# Patient Record
Sex: FEMALE | Race: WHITE | URBAN - METROPOLITAN AREA
[De-identification: names, ages, dates, MRNs, and addresses within clinical notes are randomized per-mention and may not be internally consistent; named-entity substitution may affect disease eponyms.]

---

## 2017-01-01 ENCOUNTER — APPOINTMENT (OUTPATIENT)
Dept: PHYSICAL THERAPY | Facility: CLINIC | Age: 82
DRG: 682 | End: 2017-01-01
Attending: INTERNAL MEDICINE
Payer: MEDICARE

## 2017-01-01 ENCOUNTER — APPOINTMENT (OUTPATIENT)
Dept: PHYSICAL THERAPY | Facility: CLINIC | Age: 82
DRG: 682 | End: 2017-01-01
Payer: MEDICARE

## 2017-01-01 ENCOUNTER — ANESTHESIA EVENT (OUTPATIENT)
Dept: ONCOLOGY | Facility: CLINIC | Age: 82
End: 2017-01-01

## 2017-01-01 ENCOUNTER — HOSPITAL ENCOUNTER (EMERGENCY)
Facility: CLINIC | Age: 82
Discharge: HOSPICE | End: 2017-08-03
Attending: EMERGENCY MEDICINE | Admitting: EMERGENCY MEDICINE
Payer: MEDICARE

## 2017-01-01 ENCOUNTER — APPOINTMENT (OUTPATIENT)
Dept: GENERAL RADIOLOGY | Facility: CLINIC | Age: 82
DRG: 682 | End: 2017-01-01
Attending: HOSPITALIST
Payer: MEDICARE

## 2017-01-01 ENCOUNTER — APPOINTMENT (OUTPATIENT)
Dept: ULTRASOUND IMAGING | Facility: CLINIC | Age: 82
DRG: 682 | End: 2017-01-01
Attending: EMERGENCY MEDICINE
Payer: MEDICARE

## 2017-01-01 ENCOUNTER — APPOINTMENT (OUTPATIENT)
Dept: GENERAL RADIOLOGY | Facility: CLINIC | Age: 82
End: 2017-01-01
Attending: EMERGENCY MEDICINE
Payer: MEDICARE

## 2017-01-01 ENCOUNTER — HOSPITAL ENCOUNTER (INPATIENT)
Facility: CLINIC | Age: 82
LOS: 9 days | Discharge: HOME HEALTH | DRG: 682 | End: 2017-03-06
Attending: EMERGENCY MEDICINE | Admitting: INTERNAL MEDICINE
Payer: MEDICARE

## 2017-01-01 ENCOUNTER — ANESTHESIA (OUTPATIENT)
Dept: ONCOLOGY | Facility: CLINIC | Age: 82
End: 2017-01-01

## 2017-01-01 VITALS
BODY MASS INDEX: 28.53 KG/M2 | DIASTOLIC BLOOD PRESSURE: 86 MMHG | SYSTOLIC BLOOD PRESSURE: 188 MMHG | WEIGHT: 156 LBS | OXYGEN SATURATION: 92 % | HEART RATE: 64 BPM | RESPIRATION RATE: 10 BRPM | TEMPERATURE: 97.5 F

## 2017-01-01 VITALS
TEMPERATURE: 97.9 F | OXYGEN SATURATION: 95 % | SYSTOLIC BLOOD PRESSURE: 182 MMHG | HEART RATE: 94 BPM | BODY MASS INDEX: 22.68 KG/M2 | DIASTOLIC BLOOD PRESSURE: 68 MMHG | WEIGHT: 123.24 LBS | HEIGHT: 62 IN | RESPIRATION RATE: 16 BRPM

## 2017-01-01 DIAGNOSIS — N18.30 BENIGN HYPERTENSION WITH CHRONIC KIDNEY DISEASE, STAGE III (H): Primary | ICD-10-CM

## 2017-01-01 DIAGNOSIS — Z51.5 COMFORT MEASURES ONLY STATUS: ICD-10-CM

## 2017-01-01 DIAGNOSIS — F02.818 LATE ONSET ALZHEIMER'S DISEASE WITH BEHAVIORAL DISTURBANCE (H): ICD-10-CM

## 2017-01-01 DIAGNOSIS — G30.1 LATE ONSET ALZHEIMER'S DISEASE WITH BEHAVIORAL DISTURBANCE (H): ICD-10-CM

## 2017-01-01 DIAGNOSIS — J81.0 ACUTE PULMONARY EDEMA (H): ICD-10-CM

## 2017-01-01 DIAGNOSIS — R53.81 PHYSICAL DECONDITIONING: ICD-10-CM

## 2017-01-01 DIAGNOSIS — I12.9 BENIGN HYPERTENSION WITH CHRONIC KIDNEY DISEASE, STAGE III (H): Primary | ICD-10-CM

## 2017-01-01 DIAGNOSIS — E86.9 VOLUME DEPLETION: ICD-10-CM

## 2017-01-01 DIAGNOSIS — I10 BENIGN ESSENTIAL HYPERTENSION: ICD-10-CM

## 2017-01-01 DIAGNOSIS — R41.82 ALTERED MENTAL STATUS, UNSPECIFIED ALTERED MENTAL STATUS TYPE: ICD-10-CM

## 2017-01-01 DIAGNOSIS — N17.9 ACUTE RENAL FAILURE, UNSPECIFIED ACUTE RENAL FAILURE TYPE (H): ICD-10-CM

## 2017-01-01 LAB
ALBUMIN SERPL-MCNC: 2.9 G/DL (ref 3.4–5)
ALBUMIN UR-MCNC: 300 MG/DL
ALP SERPL-CCNC: 151 U/L (ref 40–150)
ALT SERPL W P-5'-P-CCNC: 27 U/L (ref 0–50)
ANION GAP SERPL CALCULATED.3IONS-SCNC: 10 MMOL/L (ref 3–14)
ANION GAP SERPL CALCULATED.3IONS-SCNC: 11 MMOL/L (ref 3–14)
ANION GAP SERPL CALCULATED.3IONS-SCNC: 8 MMOL/L (ref 3–14)
ANION GAP SERPL CALCULATED.3IONS-SCNC: 9 MMOL/L (ref 3–14)
APPEARANCE UR: CLEAR
AST SERPL W P-5'-P-CCNC: 30 U/L (ref 0–45)
BASOPHILS # BLD AUTO: 0 10E9/L (ref 0–0.2)
BASOPHILS NFR BLD AUTO: 0.2 %
BASOPHILS NFR BLD AUTO: 0.3 %
BASOPHILS NFR BLD AUTO: 0.4 %
BASOPHILS NFR BLD AUTO: 0.4 %
BASOPHILS NFR BLD AUTO: 0.5 %
BASOPHILS NFR BLD AUTO: 0.6 %
BASOPHILS NFR BLD AUTO: 0.7 %
BASOPHILS NFR BLD AUTO: 0.7 %
BILIRUB SERPL-MCNC: 0.7 MG/DL (ref 0.2–1.3)
BILIRUB UR QL STRIP: NEGATIVE
BUN SERPL-MCNC: 25 MG/DL (ref 7–30)
BUN SERPL-MCNC: 27 MG/DL (ref 7–30)
BUN SERPL-MCNC: 27 MG/DL (ref 7–30)
BUN SERPL-MCNC: 28 MG/DL (ref 7–30)
BUN SERPL-MCNC: 30 MG/DL (ref 7–30)
BUN SERPL-MCNC: 31 MG/DL (ref 7–30)
BUN SERPL-MCNC: 35 MG/DL (ref 7–30)
BUN SERPL-MCNC: 36 MG/DL (ref 7–30)
BUN SERPL-MCNC: 37 MG/DL (ref 7–30)
CALCIUM SERPL-MCNC: 7.9 MG/DL (ref 8.5–10.1)
CALCIUM SERPL-MCNC: 8 MG/DL (ref 8.5–10.1)
CALCIUM SERPL-MCNC: 8.1 MG/DL (ref 8.5–10.1)
CALCIUM SERPL-MCNC: 8.2 MG/DL (ref 8.5–10.1)
CALCIUM SERPL-MCNC: 8.2 MG/DL (ref 8.5–10.1)
CALCIUM SERPL-MCNC: 8.3 MG/DL (ref 8.5–10.1)
CALCIUM SERPL-MCNC: 8.5 MG/DL (ref 8.5–10.1)
CALCIUM SERPL-MCNC: 8.6 MG/DL (ref 8.5–10.1)
CHLORIDE SERPL-SCNC: 111 MMOL/L (ref 94–109)
CHLORIDE SERPL-SCNC: 116 MMOL/L (ref 94–109)
CHLORIDE SERPL-SCNC: 118 MMOL/L (ref 94–109)
CHLORIDE SERPL-SCNC: 119 MMOL/L (ref 94–109)
CHLORIDE SERPL-SCNC: 120 MMOL/L (ref 94–109)
CHLORIDE SERPL-SCNC: 121 MMOL/L (ref 94–109)
CHLORIDE SERPL-SCNC: 123 MMOL/L (ref 94–109)
CHLORIDE SERPL-SCNC: 125 MMOL/L (ref 94–109)
CO2 SERPL-SCNC: 18 MMOL/L (ref 20–32)
CO2 SERPL-SCNC: 19 MMOL/L (ref 20–32)
CO2 SERPL-SCNC: 20 MMOL/L (ref 20–32)
CO2 SERPL-SCNC: 20 MMOL/L (ref 20–32)
CO2 SERPL-SCNC: 21 MMOL/L (ref 20–32)
CO2 SERPL-SCNC: 23 MMOL/L (ref 20–32)
COLOR UR AUTO: YELLOW
CREAT SERPL-MCNC: 1.37 MG/DL (ref 0.52–1.04)
CREAT SERPL-MCNC: 1.43 MG/DL (ref 0.52–1.04)
CREAT SERPL-MCNC: 1.44 MG/DL (ref 0.52–1.04)
CREAT SERPL-MCNC: 1.54 MG/DL (ref 0.52–1.04)
CREAT SERPL-MCNC: 1.65 MG/DL (ref 0.52–1.04)
CREAT SERPL-MCNC: 1.69 MG/DL (ref 0.52–1.04)
CREAT SERPL-MCNC: 1.69 MG/DL (ref 0.52–1.04)
CREAT SERPL-MCNC: 1.71 MG/DL (ref 0.52–1.04)
CREAT SERPL-MCNC: 2.17 MG/DL (ref 0.52–1.04)
CREAT SERPL-MCNC: 2.17 MG/DL (ref 0.52–1.04)
CREAT SERPL-MCNC: 2.26 MG/DL (ref 0.52–1.04)
DIFFERENTIAL METHOD BLD: ABNORMAL
EOSINOPHIL # BLD AUTO: 0 10E9/L (ref 0–0.7)
EOSINOPHIL # BLD AUTO: 0.1 10E9/L (ref 0–0.7)
EOSINOPHIL # BLD AUTO: 0.2 10E9/L (ref 0–0.7)
EOSINOPHIL # BLD AUTO: 0.3 10E9/L (ref 0–0.7)
EOSINOPHIL # BLD AUTO: 0.3 10E9/L (ref 0–0.7)
EOSINOPHIL NFR BLD AUTO: 0.4 %
EOSINOPHIL NFR BLD AUTO: 1.8 %
EOSINOPHIL NFR BLD AUTO: 2.3 %
EOSINOPHIL NFR BLD AUTO: 2.9 %
EOSINOPHIL NFR BLD AUTO: 3 %
EOSINOPHIL NFR BLD AUTO: 3 %
EOSINOPHIL NFR BLD AUTO: 3.5 %
EOSINOPHIL NFR BLD AUTO: 4 %
EOSINOPHIL NFR BLD AUTO: 4.2 %
EOSINOPHIL NFR BLD AUTO: 5.3 %
ERYTHROCYTE [DISTWIDTH] IN BLOOD BY AUTOMATED COUNT: 15.3 % (ref 10–15)
ERYTHROCYTE [DISTWIDTH] IN BLOOD BY AUTOMATED COUNT: 15.8 % (ref 10–15)
ERYTHROCYTE [DISTWIDTH] IN BLOOD BY AUTOMATED COUNT: 15.8 % (ref 10–15)
ERYTHROCYTE [DISTWIDTH] IN BLOOD BY AUTOMATED COUNT: 15.9 % (ref 10–15)
ERYTHROCYTE [DISTWIDTH] IN BLOOD BY AUTOMATED COUNT: 15.9 % (ref 10–15)
ERYTHROCYTE [DISTWIDTH] IN BLOOD BY AUTOMATED COUNT: 16 % (ref 10–15)
ERYTHROCYTE [DISTWIDTH] IN BLOOD BY AUTOMATED COUNT: 16.1 % (ref 10–15)
ERYTHROCYTE [DISTWIDTH] IN BLOOD BY AUTOMATED COUNT: 16.2 % (ref 10–15)
ERYTHROCYTE [DISTWIDTH] IN BLOOD BY AUTOMATED COUNT: 16.3 % (ref 10–15)
ERYTHROCYTE [DISTWIDTH] IN BLOOD BY AUTOMATED COUNT: 16.3 % (ref 10–15)
ERYTHROCYTE [DISTWIDTH] IN BLOOD BY AUTOMATED COUNT: 16.4 % (ref 10–15)
FOLATE SERPL-MCNC: 10.6 NG/ML
GFR SERPL CREATININE-BSD FRML MDRD: 20 ML/MIN/1.7M2
GFR SERPL CREATININE-BSD FRML MDRD: 21 ML/MIN/1.7M2
GFR SERPL CREATININE-BSD FRML MDRD: 21 ML/MIN/1.7M2
GFR SERPL CREATININE-BSD FRML MDRD: 28 ML/MIN/1.7M2
GFR SERPL CREATININE-BSD FRML MDRD: 29 ML/MIN/1.7M2
GFR SERPL CREATININE-BSD FRML MDRD: 31 ML/MIN/1.7M2
GFR SERPL CREATININE-BSD FRML MDRD: 34 ML/MIN/1.7M2
GFR SERPL CREATININE-BSD FRML MDRD: 34 ML/MIN/1.7M2
GFR SERPL CREATININE-BSD FRML MDRD: 36 ML/MIN/1.7M2
GLUCOSE SERPL-MCNC: 102 MG/DL (ref 70–99)
GLUCOSE SERPL-MCNC: 104 MG/DL (ref 70–99)
GLUCOSE SERPL-MCNC: 112 MG/DL (ref 70–99)
GLUCOSE SERPL-MCNC: 113 MG/DL (ref 70–99)
GLUCOSE SERPL-MCNC: 113 MG/DL (ref 70–99)
GLUCOSE SERPL-MCNC: 114 MG/DL (ref 70–99)
GLUCOSE SERPL-MCNC: 116 MG/DL (ref 70–99)
GLUCOSE SERPL-MCNC: 132 MG/DL (ref 70–99)
GLUCOSE SERPL-MCNC: 133 MG/DL (ref 70–99)
GLUCOSE SERPL-MCNC: 88 MG/DL (ref 70–99)
GLUCOSE SERPL-MCNC: 97 MG/DL (ref 70–99)
GLUCOSE UR STRIP-MCNC: 30 MG/DL
HCT VFR BLD AUTO: 26.2 % (ref 35–47)
HCT VFR BLD AUTO: 26.4 % (ref 35–47)
HCT VFR BLD AUTO: 27 % (ref 35–47)
HCT VFR BLD AUTO: 27.7 % (ref 35–47)
HCT VFR BLD AUTO: 28.3 % (ref 35–47)
HCT VFR BLD AUTO: 28.8 % (ref 35–47)
HCT VFR BLD AUTO: 29 % (ref 35–47)
HCT VFR BLD AUTO: 29.5 % (ref 35–47)
HCT VFR BLD AUTO: 29.7 % (ref 35–47)
HCT VFR BLD AUTO: 29.8 % (ref 35–47)
HCT VFR BLD AUTO: 30.4 % (ref 35–47)
HGB BLD-MCNC: 8.4 G/DL (ref 11.7–15.7)
HGB BLD-MCNC: 8.4 G/DL (ref 11.7–15.7)
HGB BLD-MCNC: 8.7 G/DL (ref 11.7–15.7)
HGB BLD-MCNC: 8.8 G/DL (ref 11.7–15.7)
HGB BLD-MCNC: 9 G/DL (ref 11.7–15.7)
HGB BLD-MCNC: 9 G/DL (ref 11.7–15.7)
HGB BLD-MCNC: 9.1 G/DL (ref 11.7–15.7)
HGB BLD-MCNC: 9.2 G/DL (ref 11.7–15.7)
HGB BLD-MCNC: 9.5 G/DL (ref 11.7–15.7)
HGB BLD-MCNC: 9.6 G/DL (ref 11.7–15.7)
HGB BLD-MCNC: 9.6 G/DL (ref 11.7–15.7)
HGB UR QL STRIP: ABNORMAL
HYALINE CASTS #/AREA URNS LPF: 1 /LPF (ref 0–2)
IMM GRANULOCYTES # BLD: 0 10E9/L (ref 0–0.4)
IMM GRANULOCYTES NFR BLD: 0.1 %
IMM GRANULOCYTES NFR BLD: 0.2 %
IMM GRANULOCYTES NFR BLD: 0.2 %
IMM GRANULOCYTES NFR BLD: 0.3 %
IMM GRANULOCYTES NFR BLD: 0.3 %
IMM GRANULOCYTES NFR BLD: 0.4 %
IMM GRANULOCYTES NFR BLD: 0.5 %
IMM GRANULOCYTES NFR BLD: 0.6 %
INTERPRETATION ECG - MUSE: NORMAL
INTERPRETATION ECG - MUSE: NORMAL
KETONES UR STRIP-MCNC: NEGATIVE MG/DL
LEUKOCYTE ESTERASE UR QL STRIP: NEGATIVE
LYMPHOCYTES # BLD AUTO: 1 10E9/L (ref 0.8–5.3)
LYMPHOCYTES # BLD AUTO: 1 10E9/L (ref 0.8–5.3)
LYMPHOCYTES # BLD AUTO: 1.1 10E9/L (ref 0.8–5.3)
LYMPHOCYTES # BLD AUTO: 1.2 10E9/L (ref 0.8–5.3)
LYMPHOCYTES # BLD AUTO: 1.2 10E9/L (ref 0.8–5.3)
LYMPHOCYTES # BLD AUTO: 1.3 10E9/L (ref 0.8–5.3)
LYMPHOCYTES # BLD AUTO: 1.3 10E9/L (ref 0.8–5.3)
LYMPHOCYTES # BLD AUTO: 1.4 10E9/L (ref 0.8–5.3)
LYMPHOCYTES # BLD AUTO: 1.6 10E9/L (ref 0.8–5.3)
LYMPHOCYTES # BLD AUTO: 1.7 10E9/L (ref 0.8–5.3)
LYMPHOCYTES NFR BLD AUTO: 14.9 %
LYMPHOCYTES NFR BLD AUTO: 15.7 %
LYMPHOCYTES NFR BLD AUTO: 16.1 %
LYMPHOCYTES NFR BLD AUTO: 16.3 %
LYMPHOCYTES NFR BLD AUTO: 19.9 %
LYMPHOCYTES NFR BLD AUTO: 20.3 %
LYMPHOCYTES NFR BLD AUTO: 21.2 %
LYMPHOCYTES NFR BLD AUTO: 21.4 %
LYMPHOCYTES NFR BLD AUTO: 21.5 %
LYMPHOCYTES NFR BLD AUTO: 23.8 %
MCH RBC QN AUTO: 26.5 PG (ref 26.5–33)
MCH RBC QN AUTO: 26.5 PG (ref 26.5–33)
MCH RBC QN AUTO: 26.7 PG (ref 26.5–33)
MCH RBC QN AUTO: 26.8 PG (ref 26.5–33)
MCH RBC QN AUTO: 26.8 PG (ref 26.5–33)
MCH RBC QN AUTO: 26.9 PG (ref 26.5–33)
MCH RBC QN AUTO: 27 PG (ref 26.5–33)
MCHC RBC AUTO-ENTMCNC: 31 G/DL (ref 31.5–36.5)
MCHC RBC AUTO-ENTMCNC: 31.1 G/DL (ref 31.5–36.5)
MCHC RBC AUTO-ENTMCNC: 31.2 G/DL (ref 31.5–36.5)
MCHC RBC AUTO-ENTMCNC: 31.6 G/DL (ref 31.5–36.5)
MCHC RBC AUTO-ENTMCNC: 31.6 G/DL (ref 31.5–36.5)
MCHC RBC AUTO-ENTMCNC: 31.8 G/DL (ref 31.5–36.5)
MCHC RBC AUTO-ENTMCNC: 31.9 G/DL (ref 31.5–36.5)
MCHC RBC AUTO-ENTMCNC: 32.1 G/DL (ref 31.5–36.5)
MCHC RBC AUTO-ENTMCNC: 32.2 G/DL (ref 31.5–36.5)
MCHC RBC AUTO-ENTMCNC: 32.3 G/DL (ref 31.5–36.5)
MCHC RBC AUTO-ENTMCNC: 32.5 G/DL (ref 31.5–36.5)
MCV RBC AUTO: 83 FL (ref 78–100)
MCV RBC AUTO: 84 FL (ref 78–100)
MCV RBC AUTO: 85 FL (ref 78–100)
MCV RBC AUTO: 86 FL (ref 78–100)
MCV RBC AUTO: 86 FL (ref 78–100)
MONOCYTES # BLD AUTO: 0.3 10E9/L (ref 0–1.3)
MONOCYTES # BLD AUTO: 0.3 10E9/L (ref 0–1.3)
MONOCYTES # BLD AUTO: 0.4 10E9/L (ref 0–1.3)
MONOCYTES # BLD AUTO: 0.5 10E9/L (ref 0–1.3)
MONOCYTES # BLD AUTO: 0.5 10E9/L (ref 0–1.3)
MONOCYTES NFR BLD AUTO: 3.7 %
MONOCYTES NFR BLD AUTO: 4.9 %
MONOCYTES NFR BLD AUTO: 4.9 %
MONOCYTES NFR BLD AUTO: 5.7 %
MONOCYTES NFR BLD AUTO: 6 %
MONOCYTES NFR BLD AUTO: 6.2 %
MONOCYTES NFR BLD AUTO: 6.2 %
MONOCYTES NFR BLD AUTO: 6.4 %
MONOCYTES NFR BLD AUTO: 6.6 %
MONOCYTES NFR BLD AUTO: 8.2 %
NEUTROPHILS # BLD AUTO: 3.5 10E9/L (ref 1.6–8.3)
NEUTROPHILS # BLD AUTO: 3.8 10E9/L (ref 1.6–8.3)
NEUTROPHILS # BLD AUTO: 3.9 10E9/L (ref 1.6–8.3)
NEUTROPHILS # BLD AUTO: 4.2 10E9/L (ref 1.6–8.3)
NEUTROPHILS # BLD AUTO: 4.4 10E9/L (ref 1.6–8.3)
NEUTROPHILS # BLD AUTO: 4.7 10E9/L (ref 1.6–8.3)
NEUTROPHILS # BLD AUTO: 5.2 10E9/L (ref 1.6–8.3)
NEUTROPHILS # BLD AUTO: 5.6 10E9/L (ref 1.6–8.3)
NEUTROPHILS # BLD AUTO: 6 10E9/L (ref 1.6–8.3)
NEUTROPHILS # BLD AUTO: 7.3 10E9/L (ref 1.6–8.3)
NEUTROPHILS NFR BLD AUTO: 63.2 %
NEUTROPHILS NFR BLD AUTO: 66.3 %
NEUTROPHILS NFR BLD AUTO: 67.2 %
NEUTROPHILS NFR BLD AUTO: 68.6 %
NEUTROPHILS NFR BLD AUTO: 70.7 %
NEUTROPHILS NFR BLD AUTO: 71.1 %
NEUTROPHILS NFR BLD AUTO: 74.1 %
NEUTROPHILS NFR BLD AUTO: 74.9 %
NEUTROPHILS NFR BLD AUTO: 76 %
NEUTROPHILS NFR BLD AUTO: 79.4 %
NITRATE UR QL: NEGATIVE
NRBC # BLD AUTO: 0 10*3/UL
NRBC BLD AUTO-RTO: 0 /100
PH UR STRIP: 6.5 PH (ref 5–7)
PLATELET # BLD AUTO: 135 10E9/L (ref 150–450)
PLATELET # BLD AUTO: 139 10E9/L (ref 150–450)
PLATELET # BLD AUTO: 141 10E9/L (ref 150–450)
PLATELET # BLD AUTO: 151 10E9/L (ref 150–450)
PLATELET # BLD AUTO: 152 10E9/L (ref 150–450)
PLATELET # BLD AUTO: 154 10E9/L (ref 150–450)
PLATELET # BLD AUTO: 167 10E9/L (ref 150–450)
PLATELET # BLD AUTO: 167 10E9/L (ref 150–450)
PLATELET # BLD AUTO: 175 10E9/L (ref 150–450)
PLATELET # BLD AUTO: 176 10E9/L (ref 150–450)
PLATELET # BLD AUTO: 176 10E9/L (ref 150–450)
POTASSIUM SERPL-SCNC: 3.5 MMOL/L (ref 3.4–5.3)
POTASSIUM SERPL-SCNC: 3.6 MMOL/L (ref 3.4–5.3)
POTASSIUM SERPL-SCNC: 3.6 MMOL/L (ref 3.4–5.3)
POTASSIUM SERPL-SCNC: 3.7 MMOL/L (ref 3.4–5.3)
POTASSIUM SERPL-SCNC: 3.8 MMOL/L (ref 3.4–5.3)
POTASSIUM SERPL-SCNC: 3.8 MMOL/L (ref 3.4–5.3)
POTASSIUM SERPL-SCNC: 3.9 MMOL/L (ref 3.4–5.3)
POTASSIUM SERPL-SCNC: 3.9 MMOL/L (ref 3.4–5.3)
POTASSIUM SERPL-SCNC: 4 MMOL/L (ref 3.4–5.3)
POTASSIUM SERPL-SCNC: 4.1 MMOL/L (ref 3.4–5.3)
POTASSIUM SERPL-SCNC: 4.8 MMOL/L (ref 3.4–5.3)
PROT SERPL-MCNC: 6.2 G/DL (ref 6.8–8.8)
RBC # BLD AUTO: 3.12 10E12/L (ref 3.8–5.2)
RBC # BLD AUTO: 3.13 10E12/L (ref 3.8–5.2)
RBC # BLD AUTO: 3.22 10E12/L (ref 3.8–5.2)
RBC # BLD AUTO: 3.32 10E12/L (ref 3.8–5.2)
RBC # BLD AUTO: 3.33 10E12/L (ref 3.8–5.2)
RBC # BLD AUTO: 3.39 10E12/L (ref 3.8–5.2)
RBC # BLD AUTO: 3.39 10E12/L (ref 3.8–5.2)
RBC # BLD AUTO: 3.44 10E12/L (ref 3.8–5.2)
RBC # BLD AUTO: 3.53 10E12/L (ref 3.8–5.2)
RBC # BLD AUTO: 3.55 10E12/L (ref 3.8–5.2)
RBC # BLD AUTO: 3.57 10E12/L (ref 3.8–5.2)
RBC #/AREA URNS AUTO: 2 /HPF (ref 0–2)
SODIUM SERPL-SCNC: 142 MMOL/L (ref 133–144)
SODIUM SERPL-SCNC: 143 MMOL/L (ref 133–144)
SODIUM SERPL-SCNC: 145 MMOL/L (ref 133–144)
SODIUM SERPL-SCNC: 146 MMOL/L (ref 133–144)
SODIUM SERPL-SCNC: 147 MMOL/L (ref 133–144)
SODIUM SERPL-SCNC: 148 MMOL/L (ref 133–144)
SODIUM SERPL-SCNC: 148 MMOL/L (ref 133–144)
SODIUM SERPL-SCNC: 149 MMOL/L (ref 133–144)
SODIUM SERPL-SCNC: 151 MMOL/L (ref 133–144)
SODIUM SERPL-SCNC: 152 MMOL/L (ref 133–144)
SODIUM UR-SCNC: 77 MMOL/L
SP GR UR STRIP: 1.01 (ref 1–1.03)
TSH SERPL DL<=0.005 MIU/L-ACNC: 3.06 MU/L (ref 0.4–4)
URN SPEC COLLECT METH UR: ABNORMAL
UROBILINOGEN UR STRIP-MCNC: NORMAL MG/DL (ref 0–2)
VIT B12 SERPL-MCNC: 749 PG/ML (ref 193–986)
WBC # BLD AUTO: 5.4 10E9/L (ref 4–11)
WBC # BLD AUTO: 5.5 10E9/L (ref 4–11)
WBC # BLD AUTO: 5.7 10E9/L (ref 4–11)
WBC # BLD AUTO: 6 10E9/L (ref 4–11)
WBC # BLD AUTO: 6.3 10E9/L (ref 4–11)
WBC # BLD AUTO: 6.4 10E9/L (ref 4–11)
WBC # BLD AUTO: 6.4 10E9/L (ref 4–11)
WBC # BLD AUTO: 6.9 10E9/L (ref 4–11)
WBC # BLD AUTO: 7.6 10E9/L (ref 4–11)
WBC # BLD AUTO: 8 10E9/L (ref 4–11)
WBC # BLD AUTO: 9.6 10E9/L (ref 4–11)
WBC #/AREA URNS AUTO: 3 /HPF (ref 0–2)

## 2017-01-01 PROCEDURE — 99233 SBSQ HOSP IP/OBS HIGH 50: CPT | Performed by: INTERNAL MEDICINE

## 2017-01-01 PROCEDURE — 25000132 ZZH RX MED GY IP 250 OP 250 PS 637: Mod: GY | Performed by: INTERNAL MEDICINE

## 2017-01-01 PROCEDURE — A9270 NON-COVERED ITEM OR SERVICE: HCPCS | Mod: GY | Performed by: INTERNAL MEDICINE

## 2017-01-01 PROCEDURE — 80048 BASIC METABOLIC PNL TOTAL CA: CPT | Performed by: INTERNAL MEDICINE

## 2017-01-01 PROCEDURE — 12000000 ZZH R&B MED SURG/OB

## 2017-01-01 PROCEDURE — 40000193 ZZH STATISTIC PT WARD VISIT: Performed by: PHYSICAL THERAPIST

## 2017-01-01 PROCEDURE — 36415 COLL VENOUS BLD VENIPUNCTURE: CPT | Performed by: INTERNAL MEDICINE

## 2017-01-01 PROCEDURE — 94640 AIRWAY INHALATION TREATMENT: CPT

## 2017-01-01 PROCEDURE — 81001 URINALYSIS AUTO W/SCOPE: CPT | Performed by: EMERGENCY MEDICINE

## 2017-01-01 PROCEDURE — 25000132 ZZH RX MED GY IP 250 OP 250 PS 637: Mod: GY | Performed by: HOSPITALIST

## 2017-01-01 PROCEDURE — 25000128 H RX IP 250 OP 636: Performed by: INTERNAL MEDICINE

## 2017-01-01 PROCEDURE — 25000125 ZZHC RX 250: Performed by: INTERNAL MEDICINE

## 2017-01-01 PROCEDURE — 99207 ZZC CDG-MDM COMPONENT: MEETS HIGH - UP CODED: CPT | Performed by: INTERNAL MEDICINE

## 2017-01-01 PROCEDURE — 99239 HOSP IP/OBS DSCHRG MGMT >30: CPT | Performed by: INTERNAL MEDICINE

## 2017-01-01 PROCEDURE — 85025 COMPLETE CBC W/AUTO DIFF WBC: CPT | Performed by: INTERNAL MEDICINE

## 2017-01-01 PROCEDURE — 85027 COMPLETE CBC AUTOMATED: CPT | Performed by: INTERNAL MEDICINE

## 2017-01-01 PROCEDURE — 93005 ELECTROCARDIOGRAM TRACING: CPT

## 2017-01-01 PROCEDURE — 97162 PT EVAL MOD COMPLEX 30 MIN: CPT | Mod: GP | Performed by: PHYSICAL THERAPIST

## 2017-01-01 PROCEDURE — 99223 1ST HOSP IP/OBS HIGH 75: CPT | Mod: AI | Performed by: INTERNAL MEDICINE

## 2017-01-01 PROCEDURE — 84300 ASSAY OF URINE SODIUM: CPT | Performed by: INTERNAL MEDICINE

## 2017-01-01 PROCEDURE — 99285 EMERGENCY DEPT VISIT HI MDM: CPT | Mod: 25

## 2017-01-01 PROCEDURE — 76770 US EXAM ABDO BACK WALL COMP: CPT

## 2017-01-01 PROCEDURE — 40000257 ZZH STATISTIC CONSULT NO CHARGE VASC ACCESS

## 2017-01-01 PROCEDURE — 82746 ASSAY OF FOLIC ACID SERUM: CPT | Performed by: INTERNAL MEDICINE

## 2017-01-01 PROCEDURE — 97530 THERAPEUTIC ACTIVITIES: CPT | Mod: GP

## 2017-01-01 PROCEDURE — 85025 COMPLETE CBC W/AUTO DIFF WBC: CPT | Performed by: EMERGENCY MEDICINE

## 2017-01-01 PROCEDURE — 99233 SBSQ HOSP IP/OBS HIGH 50: CPT | Performed by: HOSPITALIST

## 2017-01-01 PROCEDURE — 82607 VITAMIN B-12: CPT | Performed by: INTERNAL MEDICINE

## 2017-01-01 PROCEDURE — 97116 GAIT TRAINING THERAPY: CPT | Mod: GP | Performed by: PHYSICAL THERAPIST

## 2017-01-01 PROCEDURE — 71010 XR CHEST PORT 1 VW: CPT

## 2017-01-01 PROCEDURE — 94660 CPAP INITIATION&MGMT: CPT

## 2017-01-01 PROCEDURE — 72170 X-RAY EXAM OF PELVIS: CPT

## 2017-01-01 PROCEDURE — 97530 THERAPEUTIC ACTIVITIES: CPT | Mod: GP | Performed by: PHYSICAL THERAPIST

## 2017-01-01 PROCEDURE — 36416 COLLJ CAPILLARY BLOOD SPEC: CPT | Performed by: INTERNAL MEDICINE

## 2017-01-01 PROCEDURE — 99207 ZZC MOONLIGHTING INDICATOR: CPT | Performed by: INTERNAL MEDICINE

## 2017-01-01 PROCEDURE — 40000275 ZZH STATISTIC RCP TIME EA 10 MIN

## 2017-01-01 PROCEDURE — 84443 ASSAY THYROID STIM HORMONE: CPT | Performed by: INTERNAL MEDICINE

## 2017-01-01 PROCEDURE — 93010 ELECTROCARDIOGRAM REPORT: CPT | Performed by: INTERNAL MEDICINE

## 2017-01-01 PROCEDURE — 80048 BASIC METABOLIC PNL TOTAL CA: CPT | Performed by: EMERGENCY MEDICINE

## 2017-01-01 PROCEDURE — 25000125 ZZHC RX 250: Performed by: EMERGENCY MEDICINE

## 2017-01-01 PROCEDURE — 36415 COLL VENOUS BLD VENIPUNCTURE: CPT | Performed by: EMERGENCY MEDICINE

## 2017-01-01 PROCEDURE — 40000193 ZZH STATISTIC PT WARD VISIT

## 2017-01-01 PROCEDURE — 84295 ASSAY OF SERUM SODIUM: CPT | Performed by: INTERNAL MEDICINE

## 2017-01-01 PROCEDURE — A9270 NON-COVERED ITEM OR SERVICE: HCPCS | Mod: GY | Performed by: HOSPITALIST

## 2017-01-01 PROCEDURE — 25000128 H RX IP 250 OP 636: Performed by: EMERGENCY MEDICINE

## 2017-01-01 PROCEDURE — 80053 COMPREHEN METABOLIC PANEL: CPT | Performed by: INTERNAL MEDICINE

## 2017-01-01 RX ORDER — SODIUM CHLORIDE 9 MG/ML
INJECTION, SOLUTION INTRAVENOUS CONTINUOUS
Status: DISCONTINUED | OUTPATIENT
Start: 2017-01-01 | End: 2017-01-01

## 2017-01-01 RX ORDER — AMLODIPINE BESYLATE 10 MG/1
10 TABLET ORAL DAILY
Qty: 30 TABLET | Refills: 0 | Status: SHIPPED | OUTPATIENT
Start: 2017-01-01

## 2017-01-01 RX ORDER — POTASSIUM CHLORIDE 1.5 G/1.58G
20-40 POWDER, FOR SOLUTION ORAL
Status: DISCONTINUED | OUTPATIENT
Start: 2017-01-01 | End: 2017-01-01

## 2017-01-01 RX ORDER — LORAZEPAM 0.5 MG/1
0.25 TABLET ORAL EVERY 4 HOURS PRN
Qty: 20 TABLET | Refills: 0 | Status: SHIPPED | OUTPATIENT
Start: 2017-01-01

## 2017-01-01 RX ORDER — SODIUM CHLORIDE 9 MG/ML
1000 INJECTION, SOLUTION INTRAVENOUS CONTINUOUS
Status: DISCONTINUED | OUTPATIENT
Start: 2017-01-01 | End: 2017-01-01

## 2017-01-01 RX ORDER — ALBUTEROL SULFATE 0.83 MG/ML
1 SOLUTION RESPIRATORY (INHALATION) EVERY 4 HOURS PRN
Qty: 1 BOX | Refills: 0 | Status: SHIPPED | OUTPATIENT
Start: 2017-01-01

## 2017-01-01 RX ORDER — POLYETHYLENE GLYCOL 3350 17 G/17G
17 POWDER, FOR SOLUTION ORAL DAILY
Status: DISCONTINUED | OUTPATIENT
Start: 2017-01-01 | End: 2017-01-01 | Stop reason: HOSPADM

## 2017-01-01 RX ORDER — METOPROLOL TARTRATE 50 MG
50 TABLET ORAL 2 TIMES DAILY
Status: DISCONTINUED | OUTPATIENT
Start: 2017-01-01 | End: 2017-01-01 | Stop reason: ALTCHOICE

## 2017-01-01 RX ORDER — AMOXICILLIN 250 MG
1 CAPSULE ORAL DAILY
Status: DISCONTINUED | OUTPATIENT
Start: 2017-01-01 | End: 2017-01-01 | Stop reason: HOSPADM

## 2017-01-01 RX ORDER — POTASSIUM CHLORIDE 7.45 MG/ML
10 INJECTION INTRAVENOUS
Status: DISCONTINUED | OUTPATIENT
Start: 2017-01-01 | End: 2017-01-01

## 2017-01-01 RX ORDER — POTASSIUM CHLORIDE 1500 MG/1
20-40 TABLET, EXTENDED RELEASE ORAL
Status: DISCONTINUED | OUTPATIENT
Start: 2017-01-01 | End: 2017-01-01

## 2017-01-01 RX ORDER — MULTIPLE VITAMINS W/ MINERALS TAB 9MG-400MCG
1 TAB ORAL DAILY
Status: DISCONTINUED | OUTPATIENT
Start: 2017-01-01 | End: 2017-01-01 | Stop reason: HOSPADM

## 2017-01-01 RX ORDER — POLYETHYLENE GLYCOL 3350 17 G/17G
17 POWDER, FOR SOLUTION ORAL DAILY
COMMUNITY

## 2017-01-01 RX ORDER — HYDRALAZINE HYDROCHLORIDE 20 MG/ML
10 INJECTION INTRAMUSCULAR; INTRAVENOUS EVERY 4 HOURS PRN
Status: DISCONTINUED | OUTPATIENT
Start: 2017-01-01 | End: 2017-01-01 | Stop reason: HOSPADM

## 2017-01-01 RX ORDER — HALOPERIDOL 5 MG/ML
2 INJECTION INTRAMUSCULAR EVERY 6 HOURS PRN
Status: DISCONTINUED | OUTPATIENT
Start: 2017-01-01 | End: 2017-01-01

## 2017-01-01 RX ORDER — AMLODIPINE BESYLATE 10 MG/1
10 TABLET ORAL DAILY
Status: DISCONTINUED | OUTPATIENT
Start: 2017-01-01 | End: 2017-01-01 | Stop reason: HOSPADM

## 2017-01-01 RX ORDER — PANTOPRAZOLE SODIUM 40 MG/1
40 TABLET, DELAYED RELEASE ORAL
Status: DISCONTINUED | OUTPATIENT
Start: 2017-01-01 | End: 2017-01-01 | Stop reason: HOSPADM

## 2017-01-01 RX ORDER — ACETAMINOPHEN 325 MG/1
650 TABLET ORAL EVERY 6 HOURS PRN
Status: DISCONTINUED | OUTPATIENT
Start: 2017-01-01 | End: 2017-01-01 | Stop reason: HOSPADM

## 2017-01-01 RX ORDER — AMOXICILLIN 250 MG
1 CAPSULE ORAL DAILY PRN
Status: DISCONTINUED | OUTPATIENT
Start: 2017-01-01 | End: 2017-01-01 | Stop reason: HOSPADM

## 2017-01-01 RX ORDER — HALOPERIDOL DECANOATE 50 MG/ML
25 INJECTION INTRAMUSCULAR 2 TIMES DAILY PRN
Status: DISCONTINUED | OUTPATIENT
Start: 2017-01-01 | End: 2017-01-01

## 2017-01-01 RX ORDER — IPRATROPIUM BROMIDE AND ALBUTEROL SULFATE 2.5; .5 MG/3ML; MG/3ML
3 SOLUTION RESPIRATORY (INHALATION)
Status: DISCONTINUED | OUTPATIENT
Start: 2017-01-01 | End: 2017-01-01 | Stop reason: HOSPADM

## 2017-01-01 RX ORDER — DEXTROSE MONOHYDRATE 50 MG/ML
INJECTION, SOLUTION INTRAVENOUS CONTINUOUS
Status: DISCONTINUED | OUTPATIENT
Start: 2017-01-01 | End: 2017-01-01

## 2017-01-01 RX ORDER — HALOPERIDOL 5 MG/ML
5 INJECTION INTRAMUSCULAR ONCE
Status: COMPLETED | OUTPATIENT
Start: 2017-01-01 | End: 2017-01-01

## 2017-01-01 RX ORDER — LORAZEPAM 2 MG/ML
0.5 INJECTION INTRAMUSCULAR ONCE
Status: COMPLETED | OUTPATIENT
Start: 2017-01-01 | End: 2017-01-01

## 2017-01-01 RX ORDER — HYDRALAZINE HYDROCHLORIDE 25 MG/1
25 TABLET, FILM COATED ORAL EVERY 8 HOURS SCHEDULED
Status: DISCONTINUED | OUTPATIENT
Start: 2017-01-01 | End: 2017-01-01 | Stop reason: HOSPADM

## 2017-01-01 RX ORDER — MORPHINE SULFATE 10 MG/5ML
2.5 SOLUTION ORAL
Qty: 45 ML | Refills: 0 | Status: SHIPPED | OUTPATIENT
Start: 2017-01-01

## 2017-01-01 RX ORDER — QUETIAPINE FUMARATE 25 MG/1
25 TABLET, FILM COATED ORAL DAILY
Status: DISCONTINUED | OUTPATIENT
Start: 2017-01-01 | End: 2017-01-01 | Stop reason: HOSPADM

## 2017-01-01 RX ORDER — METOPROLOL TARTRATE 50 MG
75 TABLET ORAL 2 TIMES DAILY
Qty: 60 TABLET | Refills: 0 | Status: SHIPPED | OUTPATIENT
Start: 2017-01-01

## 2017-01-01 RX ORDER — METOPROLOL TARTRATE 50 MG
50 TABLET ORAL 2 TIMES DAILY
Status: DISCONTINUED | OUTPATIENT
Start: 2017-01-01 | End: 2017-01-01

## 2017-01-01 RX ORDER — POTASSIUM CHLORIDE 29.8 MG/ML
20 INJECTION INTRAVENOUS
Status: DISCONTINUED | OUTPATIENT
Start: 2017-01-01 | End: 2017-01-01

## 2017-01-01 RX ORDER — NITROGLYCERIN 80 MG/1
1 PATCH TRANSDERMAL DAILY
Status: DISCONTINUED | OUTPATIENT
Start: 2017-01-01 | End: 2017-01-01

## 2017-01-01 RX ORDER — HYDRALAZINE HYDROCHLORIDE 25 MG/1
25 TABLET, FILM COATED ORAL EVERY 8 HOURS
Qty: 60 TABLET | Refills: 0 | Status: SHIPPED | OUTPATIENT
Start: 2017-01-01

## 2017-01-01 RX ADMIN — Medication 25 MG: at 18:08

## 2017-01-01 RX ADMIN — PANTOPRAZOLE SODIUM 40 MG: 40 TABLET, DELAYED RELEASE ORAL at 09:06

## 2017-01-01 RX ADMIN — AMLODIPINE BESYLATE 10 MG: 10 TABLET ORAL at 09:08

## 2017-01-01 RX ADMIN — HALOPERIDOL LACTATE 2 MG: 5 INJECTION, SOLUTION INTRAMUSCULAR at 19:18

## 2017-01-01 RX ADMIN — Medication 12.5 MG: at 09:29

## 2017-01-01 RX ADMIN — SODIUM CHLORIDE, PRESERVATIVE FREE: 5 INJECTION INTRAVENOUS at 00:15

## 2017-01-01 RX ADMIN — METOPROLOL TARTRATE 5 MG: 5 INJECTION INTRAVENOUS at 18:29

## 2017-01-01 RX ADMIN — SODIUM CHLORIDE, PRESERVATIVE FREE: 5 INJECTION INTRAVENOUS at 08:40

## 2017-01-01 RX ADMIN — HYDRALAZINE HYDROCHLORIDE 10 MG: 20 INJECTION INTRAMUSCULAR; INTRAVENOUS at 11:43

## 2017-01-01 RX ADMIN — PANTOPRAZOLE SODIUM 40 MG: 40 TABLET, DELAYED RELEASE ORAL at 08:40

## 2017-01-01 RX ADMIN — SODIUM CHLORIDE, PRESERVATIVE FREE: 5 INJECTION INTRAVENOUS at 12:00

## 2017-01-01 RX ADMIN — HYDRALAZINE HYDROCHLORIDE 10 MG: 20 INJECTION INTRAMUSCULAR; INTRAVENOUS at 07:57

## 2017-01-01 RX ADMIN — HYDRALAZINE HYDROCHLORIDE 25 MG: 25 TABLET ORAL at 18:51

## 2017-01-01 RX ADMIN — AMLODIPINE BESYLATE 10 MG: 10 TABLET ORAL at 18:51

## 2017-01-01 RX ADMIN — Medication 25 MG: at 14:47

## 2017-01-01 RX ADMIN — MULTIPLE VITAMINS W/ MINERALS TAB 1 TABLET: TAB at 09:13

## 2017-01-01 RX ADMIN — POLYETHYLENE GLYCOL 3350 17 G: 17 POWDER, FOR SOLUTION ORAL at 08:39

## 2017-01-01 RX ADMIN — METOPROLOL TARTRATE 50 MG: 50 TABLET, FILM COATED ORAL at 08:42

## 2017-01-01 RX ADMIN — IPRATROPIUM BROMIDE AND ALBUTEROL SULFATE 3 ML: .5; 3 SOLUTION RESPIRATORY (INHALATION) at 05:18

## 2017-01-01 RX ADMIN — HYDRALAZINE HYDROCHLORIDE 10 MG: 20 INJECTION INTRAMUSCULAR; INTRAVENOUS at 16:16

## 2017-01-01 RX ADMIN — HYDRALAZINE HYDROCHLORIDE 10 MG: 20 INJECTION INTRAMUSCULAR; INTRAVENOUS at 09:19

## 2017-01-01 RX ADMIN — Medication 25 MG: at 16:45

## 2017-01-01 RX ADMIN — SENNOSIDES AND DOCUSATE SODIUM 1 TABLET: 8.6; 5 TABLET ORAL at 09:13

## 2017-01-01 RX ADMIN — SENNOSIDES AND DOCUSATE SODIUM 1 TABLET: 8.6; 5 TABLET ORAL at 08:40

## 2017-01-01 RX ADMIN — ACETAMINOPHEN 650 MG: 325 TABLET, FILM COATED ORAL at 16:55

## 2017-01-01 RX ADMIN — METOPROLOL TARTRATE 5 MG: 5 INJECTION INTRAVENOUS at 19:12

## 2017-01-01 RX ADMIN — Medication 12.5 MG: at 06:09

## 2017-01-01 RX ADMIN — METOPROLOL TARTRATE 50 MG: 50 TABLET, FILM COATED ORAL at 08:27

## 2017-01-01 RX ADMIN — Medication 12.5 MG: at 08:40

## 2017-01-01 RX ADMIN — Medication 12.5 MG: at 01:36

## 2017-01-01 RX ADMIN — NITROGLYCERIN 1 PATCH: 0.4 PATCH TRANSDERMAL at 10:34

## 2017-01-01 RX ADMIN — PANTOPRAZOLE SODIUM 40 MG: 40 TABLET, DELAYED RELEASE ORAL at 09:09

## 2017-01-01 RX ADMIN — METOPROLOL TARTRATE 5 MG: 5 INJECTION INTRAVENOUS at 13:32

## 2017-01-01 RX ADMIN — METOPROLOL TARTRATE 5 MG: 5 INJECTION INTRAVENOUS at 00:54

## 2017-01-01 RX ADMIN — SODIUM CHLORIDE 500 ML: 9 INJECTION, SOLUTION INTRAVENOUS at 16:13

## 2017-01-01 RX ADMIN — METOPROLOL TARTRATE 5 MG: 5 INJECTION INTRAVENOUS at 00:38

## 2017-01-01 RX ADMIN — POLYETHYLENE GLYCOL 3350 17 G: 17 POWDER, FOR SOLUTION ORAL at 09:04

## 2017-01-01 RX ADMIN — HYDRALAZINE HYDROCHLORIDE 25 MG: 25 TABLET ORAL at 13:45

## 2017-01-01 RX ADMIN — Medication 12.5 MG: at 02:09

## 2017-01-01 RX ADMIN — HALOPERIDOL LACTATE 2 MG: 5 INJECTION, SOLUTION INTRAMUSCULAR at 21:29

## 2017-01-01 RX ADMIN — METOPROLOL TARTRATE 5 MG: 5 INJECTION INTRAVENOUS at 06:20

## 2017-01-01 RX ADMIN — LORAZEPAM 0.5 MG: 2 INJECTION INTRAMUSCULAR; INTRAVENOUS at 19:13

## 2017-01-01 RX ADMIN — HYDRALAZINE HYDROCHLORIDE 10 MG: 20 INJECTION INTRAMUSCULAR; INTRAVENOUS at 15:02

## 2017-01-01 RX ADMIN — POLYETHYLENE GLYCOL 3350 17 G: 17 POWDER, FOR SOLUTION ORAL at 08:47

## 2017-01-01 RX ADMIN — SENNOSIDES AND DOCUSATE SODIUM 1 TABLET: 8.6; 5 TABLET ORAL at 08:31

## 2017-01-01 RX ADMIN — HYDRALAZINE HYDROCHLORIDE 10 MG: 20 INJECTION INTRAMUSCULAR; INTRAVENOUS at 21:52

## 2017-01-01 RX ADMIN — Medication 2.5 MG: at 22:23

## 2017-01-01 RX ADMIN — Medication 12.5 MG: at 08:41

## 2017-01-01 RX ADMIN — AMLODIPINE BESYLATE 10 MG: 10 TABLET ORAL at 09:04

## 2017-01-01 RX ADMIN — HYDRALAZINE HYDROCHLORIDE 10 MG: 20 INJECTION INTRAMUSCULAR; INTRAVENOUS at 15:31

## 2017-01-01 RX ADMIN — HALOPERIDOL LACTATE 5 MG: 5 INJECTION, SOLUTION INTRAMUSCULAR at 02:47

## 2017-01-01 RX ADMIN — METOPROLOL TARTRATE 5 MG: 5 INJECTION INTRAVENOUS at 19:17

## 2017-01-01 RX ADMIN — METOPROLOL TARTRATE 5 MG: 5 INJECTION INTRAVENOUS at 13:18

## 2017-01-01 RX ADMIN — Medication 25 MG: at 15:46

## 2017-01-01 RX ADMIN — Medication 25 MG: at 01:40

## 2017-01-01 RX ADMIN — ACETAMINOPHEN 650 MG: 325 TABLET, FILM COATED ORAL at 23:39

## 2017-01-01 RX ADMIN — Medication 12.5 MG: at 08:31

## 2017-01-01 RX ADMIN — DEXTROSE MONOHYDRATE: 50 INJECTION, SOLUTION INTRAVENOUS at 01:51

## 2017-01-01 RX ADMIN — Medication 12.5 MG: at 05:56

## 2017-01-01 RX ADMIN — POLYETHYLENE GLYCOL 3350 17 G: 17 POWDER, FOR SOLUTION ORAL at 08:32

## 2017-01-01 RX ADMIN — NITROGLYCERIN 1 PATCH: 0.4 PATCH TRANSDERMAL at 08:39

## 2017-01-01 RX ADMIN — MULTIPLE VITAMINS W/ MINERALS TAB 1 TABLET: TAB at 09:06

## 2017-01-01 RX ADMIN — METOPROLOL TARTRATE 50 MG: 50 TABLET, FILM COATED ORAL at 20:28

## 2017-01-01 RX ADMIN — AMLODIPINE BESYLATE 10 MG: 10 TABLET ORAL at 09:57

## 2017-01-01 RX ADMIN — METOPROLOL TARTRATE 5 MG: 5 INJECTION INTRAVENOUS at 01:15

## 2017-01-01 RX ADMIN — Medication 25 MG: at 15:31

## 2017-01-01 RX ADMIN — METOPROLOL TARTRATE 75 MG: 50 TABLET, FILM COATED ORAL at 09:06

## 2017-01-01 RX ADMIN — SODIUM CHLORIDE, PRESERVATIVE FREE: 5 INJECTION INTRAVENOUS at 04:19

## 2017-01-01 RX ADMIN — SODIUM CHLORIDE, PRESERVATIVE FREE: 5 INJECTION INTRAVENOUS at 00:54

## 2017-01-01 RX ADMIN — Medication 25 MG: at 16:59

## 2017-01-01 RX ADMIN — HYDRALAZINE HYDROCHLORIDE 10 MG: 20 INJECTION INTRAMUSCULAR; INTRAVENOUS at 03:05

## 2017-01-01 RX ADMIN — HYDRALAZINE HYDROCHLORIDE 10 MG: 20 INJECTION INTRAMUSCULAR; INTRAVENOUS at 06:11

## 2017-01-01 RX ADMIN — HYDRALAZINE HYDROCHLORIDE 10 MG: 20 INJECTION INTRAMUSCULAR; INTRAVENOUS at 17:22

## 2017-01-01 RX ADMIN — HALOPERIDOL LACTATE 5 MG: 5 INJECTION, SOLUTION INTRAMUSCULAR at 23:58

## 2017-01-01 RX ADMIN — HYDRALAZINE HYDROCHLORIDE 10 MG: 20 INJECTION INTRAMUSCULAR; INTRAVENOUS at 08:26

## 2017-01-01 RX ADMIN — Medication 12.5 MG: at 09:37

## 2017-01-01 RX ADMIN — PANTOPRAZOLE SODIUM 40 MG: 40 TABLET, DELAYED RELEASE ORAL at 08:47

## 2017-01-01 RX ADMIN — HALOPERIDOL LACTATE 2 MG: 5 INJECTION, SOLUTION INTRAMUSCULAR at 18:52

## 2017-01-01 RX ADMIN — POLYETHYLENE GLYCOL 3350 17 G: 17 POWDER, FOR SOLUTION ORAL at 09:10

## 2017-01-01 RX ADMIN — HALOPERIDOL LACTATE 2 MG: 5 INJECTION, SOLUTION INTRAMUSCULAR at 03:56

## 2017-01-01 RX ADMIN — METOPROLOL TARTRATE 5 MG: 5 INJECTION INTRAVENOUS at 01:06

## 2017-01-01 RX ADMIN — SENNOSIDES AND DOCUSATE SODIUM 1 TABLET: 8.6; 5 TABLET ORAL at 09:06

## 2017-01-01 RX ADMIN — HYDRALAZINE HYDROCHLORIDE 10 MG: 20 INJECTION INTRAMUSCULAR; INTRAVENOUS at 22:23

## 2017-01-01 RX ADMIN — METOPROLOL TARTRATE 5 MG: 5 INJECTION INTRAVENOUS at 14:16

## 2017-01-01 RX ADMIN — PANTOPRAZOLE SODIUM 40 MG: 40 TABLET, DELAYED RELEASE ORAL at 08:31

## 2017-01-01 RX ADMIN — METOPROLOL TARTRATE 50 MG: 50 TABLET, FILM COATED ORAL at 09:04

## 2017-01-01 RX ADMIN — METOPROLOL TARTRATE 5 MG: 5 INJECTION INTRAVENOUS at 06:44

## 2017-01-01 RX ADMIN — HYDRALAZINE HYDROCHLORIDE 10 MG: 20 INJECTION INTRAMUSCULAR; INTRAVENOUS at 01:25

## 2017-01-01 RX ADMIN — HYDRALAZINE HYDROCHLORIDE 10 MG: 20 INJECTION INTRAMUSCULAR; INTRAVENOUS at 04:24

## 2017-01-01 RX ADMIN — SENNOSIDES AND DOCUSATE SODIUM 1 TABLET: 8.6; 5 TABLET ORAL at 08:47

## 2017-01-01 RX ADMIN — Medication 12.5 MG: at 09:09

## 2017-01-01 RX ADMIN — METOPROLOL TARTRATE 5 MG: 5 INJECTION INTRAVENOUS at 08:39

## 2017-01-01 RX ADMIN — HYDRALAZINE HYDROCHLORIDE 25 MG: 25 TABLET ORAL at 04:14

## 2017-01-01 RX ADMIN — MULTIPLE VITAMINS W/ MINERALS TAB 1 TABLET: TAB at 08:47

## 2017-01-01 RX ADMIN — DEXTROSE MONOHYDRATE: 50 INJECTION, SOLUTION INTRAVENOUS at 13:04

## 2017-01-01 RX ADMIN — HYDRALAZINE HYDROCHLORIDE 10 MG: 20 INJECTION INTRAMUSCULAR; INTRAVENOUS at 02:12

## 2017-01-01 RX ADMIN — MULTIPLE VITAMINS W/ MINERALS TAB 1 TABLET: TAB at 08:31

## 2017-01-01 RX ADMIN — Medication 25 MG: at 16:27

## 2017-01-01 RX ADMIN — HYDRALAZINE HYDROCHLORIDE 10 MG: 20 INJECTION INTRAMUSCULAR; INTRAVENOUS at 17:10

## 2017-01-01 RX ADMIN — MULTIPLE VITAMINS W/ MINERALS TAB 1 TABLET: TAB at 16:27

## 2017-01-01 ASSESSMENT — ACTIVITIES OF DAILY LIVING (ADL)
COGNITION: 1 - ATTENTION OR MEMORY DEFICITS
SWALLOWING: 0-->SWALLOWS FOODS/LIQUIDS WITHOUT DIFFICULTY
DRESS: 2-->ASSISTIVE PERSON
BATHING: 2-->ASSISTIVE PERSON
RETIRED_EATING: 2-->ASSISTIVE PERSON
AMBULATION: 2-->ASSISTIVE PERSON
TOILETING: 3-->ASSISTIVE EQUIPMENT AND PERSON
TRANSFERRING: 2-->ASSISTIVE PERSON
RETIRED_COMMUNICATION: 3-->UNABLE TO UNDERSTAND (NOT RELATED TO LANGUAGE BARRIER)

## 2017-01-01 ASSESSMENT — ENCOUNTER SYMPTOMS: CONFUSION: 1

## 2017-01-01 ASSESSMENT — PAIN DESCRIPTION - DESCRIPTORS: DESCRIPTORS: ACHING

## 2017-02-25 PROBLEM — B37.2 CANDIDIASIS OF SKIN: Status: ACTIVE | Noted: 2017-01-01

## 2017-02-25 PROBLEM — R41.82 CHANGE IN MENTAL STATUS: Status: ACTIVE | Noted: 2017-01-01

## 2017-02-25 PROBLEM — I25.10 CHRONIC CORONARY ARTERY DISEASE: Status: ACTIVE | Noted: 2017-01-01

## 2017-02-25 NOTE — ED NOTES
"[facility]  ED Nurse Handoff Report    ED Chief complaint: Altered Mental Status (pt has hx of demintia; family states pt has had an increase of confussion since she has started Lexapro)      ED Diagnosis:   Final diagnoses:   Acute renal failure, unspecified acute renal failure type (H)   Volume depletion   Altered mental status, unspecified altered mental status type       Code Status: DNR / DNI    Allergies:   Allergies   Allergen Reactions     Blood Transfusion Related (Informational Only) Other (See Comments)     Patient has a history of a clinically significant antibody against RBC antigens.  A delay in compatible RBCs may occur.       Activity level:  Total Care     Needed?: No    Isolation: No  Infection: Not Applicable    Bariatric?: No      Vital Signs:   Vitals:    02/25/17 1245 02/25/17 1433   BP: (!) 195/91    Resp: 18    Temp:  98.3  F (36.8  C)   TempSrc:  Oral   SpO2: 94%    Weight: 56.7 kg (125 lb)    Height: 1.575 m (5' 2\")        Cardiac Rhythm: ,        Pain level:  Denies     Is this patient confused?: Yes    Patient Report: Initial Complaint: Pt here from memory care for worsening confusion and decreased oral intake.The family states that she has been having visual hallucination the last week and apparently fell couple of days ago but nobody witnessed.  Focused Assessment: Marked confusion and disorientation. Generalized weakness. Poor oral intake. Difficult to obtain venous access.   Tests Performed: Bloodwork. US   Abnormal Results: GFR 20, Hgb 9.6.   Treatments provided: 500 ml NS.     Family Comments: At bedside     OBS brochure/video discussed/provided to patient: N/A    ED Medications:   Medications   sodium chloride (PF) 0.9% PF flush 3 mL (not administered)   sodium chloride (PF) 0.9% PF flush 3 mL (not administered)   0.9% sodium chloride infusion (not administered)   0.9% sodium chloride BOLUS (not administered)     Followed by   0.9% sodium chloride " infusion (not administered)       Drips infusing?:  No      ED NURSE PHONE NUMBER: [phone]

## 2017-02-25 NOTE — IP AVS SNAPSHOT
[address], [address]    [facility] [address]    Phone:  [phone]                                       After Visit Summary   2/25/2017    Mallorie Erickson    MRN: [id number]           After Visit Summary Signature Page     I have received my discharge instructions, and my questions have been answered. I have discussed any challenges I see with this plan with the nurse or doctor.    ..........................................................................................................................................  Patient/Patient Representative Signature      ..........................................................................................................................................  Patient Representative Print Name and Relationship to Patient    ..................................................               ................................................  Date                                            Time    ..........................................................................................................................................  Reviewed by Signature/Title    ...................................................              ..............................................  Date                                                            Time

## 2017-02-25 NOTE — IP AVS SNAPSHOT
` [place] [address] ONCOLOGY: [phone]            Medication Administration Report for Mallorie Erickson as of 03/06/17 1157   Legend:    Given Hold Not Given Due Canceled Entry Other Actions    Time Time (Time) Time  Time-Action       Inactive    Active    Linked        Medications 02/28/17 03/01/17 03/02/17 03/03/17 03/04/17 03/05/17 03/06/17    acetaminophen (TYLENOL) tablet 650 mg  Dose: 650 mg Freq: EVERY 6 HOURS PRN Route: PO  PRN Comment: Pain  Start: 02/25/17 1640   Admin Instructions: Maximum acetaminophen dose from all sources = 75 mg/kg/day not to exceed 4 grams/day.        1655 (650 mg)-Given       2339 (650 mg)-Given              amLODIPine (NORVASC) tablet 10 mg  Dose: 10 mg Freq: DAILY Route: PO  Start: 03/03/17 0900   Admin Instructions: Hold for SBP < 100        0957 (10 mg)-Given        0904 (10 mg)-Given        (1307)-Not Given [C]       1851 (10 mg)-Given        0908 (10 mg)-Given           dextrose 5% and 0.45% NaCl infusion  Rate: 100 mL/hr Freq: CONTINUOUS Route: IV  Start: 03/05/17 1400         (2255)-Not Given            hydrALAZINE (APRESOLINE) half-tab 12.5-25 mg  Dose: 12.5-25 mg Freq: EVERY 6 HOURS PRN Route: PO  PRN Comment: SBP > 180  Start: 03/04/17 0959         0140 (25 mg)-Given            hydrALAZINE (APRESOLINE) injection 10 mg  Dose: 10 mg Freq: EVERY 4 HOURS PRN Route: IV  PRN Reason: high blood pressure  PRN Comment: give for SBP > 180  Start: 02/25/17 1640    0125 (10 mg)-Given       0611 (10 mg)-Given       1143 (10 mg)-Given [C]       1722 (10 mg)-Given        0424 (10 mg)-Given       1502 (10 mg)-Given [C]       2223 (10 mg)-Given        0305 (10 mg)-Given       1531 (10 mg)-Given       2152 (10 mg)-Given        0212 (10 mg)-Given       0826 (10 mg)-Given              hydrALAZINE (APRESOLINE) tablet 25 mg  Dose: 25 mg Freq: EVERY 8 HOURS SCHEDULED Route: PO  Start: 03/05/17 1400         (1400)-Not Given [C]       1851 (25 mg)-Given       (2255)-Not Given [C]         0414 (25 mg)-Given       [ ] 1200       [ ] 2000           metoprolol (LOPRESSOR) tablet 75 mg  Dose: 75 mg Freq: 2 TIMES DAILY Route: PO  Start: 03/05/17 2100   Admin Instructions: Hold for SBP < 100 or HR < 60          (2255)-Not Given [C]        0906 (75 mg)-Given       [ ] 2100           miconazole (MICATIN; MICRO GUARD) 2 % powder  Freq: 3 TIMES DAILY PRN Route: Top  PRN Comment: rash  Start: 02/25/17 1640   Admin Instructions: Apply to affected area.<br>Formulary alternate for nystatin powder.               multivitamin, therapeutic with minerals (THERA-VIT-M) tablet 1 tablet  Dose: 1 tablet Freq: DAILY Route: PO  Start: 02/28/17 1500    1627 (1 tablet)-Given        (1422)-Not Given [C]        0847 (1 tablet)-Given        0831 (1 tablet)-Given        0906 (1 tablet)-Given        (1307)-Not Given [C]        0913 (1 tablet)-Given           pantoprazole (PROTONIX) EC tablet 40 mg  Dose: 40 mg Freq: EVERY MORNING BEFORE BREAKFAST Route: PO  Start: 02/26/17 0730    0840 (40 mg)-Given        (1421)-Not Given [C]        0847 (40 mg)-Given        0831 (40 mg)-Given        0906 (40 mg)-Given        (1307)-Not Given [C]        0909 (40 mg)-Given           polyethylene glycol (MIRALAX/GLYCOLAX) Packet 17 g  Dose: 17 g Freq: DAILY Route: PO  Start: 02/26/17 0800   Admin Instructions: 1 Packet = 17 grams. Mixed prescribed dose in 8 ounces of water.  1 Packet = 17 grams. Mixed prescribed dose in 8 ounces of water. Follow with 8 oz. of water.     0839 (17 g)-Given        (1421)-Not Given [C]        0847 (17 g)-Given        0832 (17 g)-Given        0904 (17 g)-Given        (1307)-Not Given [C]        0910 (17 g)-Given           QUEtiapine (SEROquel) half-tab 12.5 mg  Dose: 12.5 mg Freq: EVERY 4 HOURS PRN Route: PO  PRN Comment: agitation  Start: 03/02/17 1211       0556 (12.5 mg)-Given        0209 (12.5 mg)-Given       0609 (12.5 mg)-Given        0136 (12.5 mg)-Given            QUEtiapine (SEROquel) half-tab 12.5 mg  Dose:  12.5 mg Freq: EVERY MORNING Route: PO  Start: 02/26/17 0800    0840 (12.5 mg)-Given        (1421)-Not Given [C]        0841 (12.5 mg)-Given        0831 (12.5 mg)-Given        0929 (12.5 mg)-Given        (1307)-Not Given [C]        0909 (12.5 mg)-Given           QUEtiapine (SEROquel) tablet 25 mg  Dose: 25 mg Freq: DAILY Route: PO  Start: 02/25/17 1715   Admin Instructions: At 4pm     1627 (25 mg)-Given        1546 (25 mg)-Given        1531 (25 mg)-Given        1808 (25 mg)-Given        1645 (25 mg)-Given        (1500)-Not Given [C]        [ ] 1600           senna-docusate (SENOKOT-S;PERICOLACE) 8.6-50 MG per tablet 1 tablet  Dose: 1 tablet Freq: DAILY PRN Route: PO  PRN Reason: constipation  Start: 02/25/17 1640              senna-docusate (SENOKOT-S;PERICOLACE) 8.6-50 MG per tablet 1 tablet  Dose: 1 tablet Freq: DAILY Route: PO  Start: 02/26/17 0800    0840 (1 tablet)-Given        (1422)-Not Given [C]        0847 (1 tablet)-Given        0831 (1 tablet)-Given        0906 (1 tablet)-Given        (1307)-Not Given [C]        0913 (1 tablet)-Given           sodium chloride (PF) 0.9% PF flush 3 mL  Dose: 3 mL Freq: EVERY 1 HOUR PRN Route: IK  PRN Reason: line flush  Start: 03/01/17 2222   Admin Instructions: for peripheral IV flush post IV meds      2223 (3 mL)-Given        1531 (3 mL)-Given       1532 (3 mL)-Given       2151 (3 mL)-Given       2152 (3 mL)-Given              Discontinued Medications  Medications 02/28/17 03/01/17 03/02/17 03/03/17 03/04/17 03/05/17 03/06/17         Rate: 75 mL/hr Freq: CONTINUOUS Route: IV  Last Dose: Stopped (03/04/17 0012)  Start: 03/02/17 1200   End: 03/05/17 1347      1304 ( )-New Bag        0151 ( )-New Bag        0012-Stopped [C]        1347-Med Discontinued          Dose: 50 mg Freq: 2 TIMES DAILY Route: PO  Start: 02/28/17 2100   End: 03/05/17 0922   Admin Instructions: Hold for SBP < 100 or HR < 60     [ ] 2100        (1422)-Not Given [C]       (0166)-Not Given [C]        7679  (50 mg)-Given       2028 (50 mg)-Given        0827 (50 mg)-Given       (2031)-Not Given        0904 (50 mg)-Given       (2007)-Not Given        (0900)-Not Given [C]       0922-Med Discontinued          Dose: 3 mL Freq: EVERY 8 HOURS Route: IK  Start: 03/01/17 2230   End: 03/04/17 1648   Admin Instructions: And Q1H PRN, to lock peripheral IV dormant line.      2223 (3 mL)-Given        (0614)-Not Given       (1449)-Not Given       2230-Hold        (0533)-Not Given       (1339)-Not Given       (1906)-Not Given              (2348)-Not Given               1648-Med Discontinued

## 2017-02-25 NOTE — IP AVS SNAPSHOT
` `     [place] [address] ONCOLOGY: [phone]                 INTERAGENCY TRANSFER FORM - NOTES (H&P, Discharge Summary, Consults, Procedures, Therapies)   2017                    Hospital Admission Date: 2017  SYLVESTER TEE   : 3/25/1923  Sex: Female        Patient PCP Information     Provider PCP Type    Everardo Tate MD General         History & Physicals      H&P by Sravan Colindres MD at 2017  4:08 PM     Author:  Sravan Colindres MD Service:  Hospitalist Author Type:  Physician    Filed:  2017  4:23 PM Date of Service:  2017  4:08 PM Note Created:  2017  4:07 PM    Status:  Signed :  Sravan Colindres MD (Physician)         [facility]    History and Physical  Hospitalist       Date of Admission:  2017  Date of Service (when I saw the patient):[KF1.1] 17[KF1.2]    Assessment & Plan   Sylvester Tee is a 89 year old female who presents with history of dementia who was brought to the emergency room for change in mental status for about one week.  She has been started on Lexapro as adjuvant therapy for her dementia by her primary care physician about two weeks ago.  She has been experiencing visual hallucination and had episode of fall couple of days ago in the memory care and poor nutrition.  1-change in mental status: This is most likely due to combination of factors including initiation of Lexapro and also poor p.o. intake leading to volume depletion and acute renal failure.  The family does not want any imaging studies of her head.  I spoke to emergency room physician and medical head and order bilateral renal ultrasound to rule out obstruction.  Ram catheter in place and she will start on normal saline at 100 cc per hour. I would like to check Urine sodium as well.  2-advanced dementia: The family states that she usually does not have visual hallucination and this is new since she started on Lexapro.  The family does not  want any aggressive treatments.  3-code status: She is DNR/DNI.  I discussed that with her daughter and son-in-law.  They had had the advanced directive in their possession.  4-hypertension: She has had poor p.o. intake and most likely has not been taking her metoprolol.  I will try to p.r.n. hydralazine and continue to monitor closely.  We need to check the EKG as well    Summary:  89-year-old female with history of dementia residing in memory care presented with change in mental status, visual hallucination, nonerythematous episode of fall a few days ago without acute injury per Family and poor p.o. Intake.  She was recently started on Lexapro.    DVT Prophylaxis: Low Risk/Ambulatory with no VTE prophylaxis indicated  Code Status: DNR / DNI    Disposition: Expected discharge in 2-3 days once volume depletion improves and there is no evidence for change in mental status.    Sravan Colindres MD    Primary Care Physician   Everardo Tate    Chief Complaint   Change in mental status    History is obtained from the patient's daughter and son-in-law    History of Present Illness   Mallorie Erickson is a 89 year old female who presents with history of dementia who resides in memory care presented to emergency room with change in mental status.  The family states that she has been having visual hallucination and poor p.o. intake for the last week.  Apparently she fell couple of days ago but nobody witnessed that.  She has been started on Lexapro for the last two weeks and family believe that this is what made her do not eat and have change in mental status.  The patient was not able to keep many history.  Patient was awake and alert but mumbling.    Past Medical History    I have reviewed this patient's medical history and updated it with pertinent information if needed.[KF1.1]   Past Medical History   Diagnosis Date     Acute upper gastrointestinal hemorrhage 12/15/2015     B12 nutritional deficiency      Benign  hypertension with chronic kidney disease, stage III      CKD (chronic kidney disease) stage 3, GFR 30-59 ml/min      History of colon cancer 1999     was told she needs b12 due to this fore ever     History of femur fracture 11/2013     Hyperlipidaemia LDL goal < 100      Hypertension goal BP (blood pressure) < 140/80[KF1.3]        Past Surgical History   I have reviewed this patient's surgical history and updated it with pertinent information if needed.[KF1.1]  Past Surgical History   Procedure Laterality Date     Colonoscopy  2007     Open reduction internal fixation wrist  2001     RIGHT     Cholecystectomy, laporoscopic  1999     Cholecystectomy, Laparoscopic     Hysterectomy, pap still indicated  1970     Laparoscopic suspension bladder neck  1970     Colectomy  1999     LEFT PARTIAL      Esophagoscopy, gastroscopy, duodenoscopy (egd), combined N/A 12/9/2015     Procedure: COMBINED ESOPHAGOSCOPY, GASTROSCOPY, DUODENOSCOPY (EGD), BIOPSY SINGLE OR MULTIPLE;  Surgeon: Deepika Ren MD;  Location:  GI     Esophagoscopy, gastroscopy, duodenoscopy (egd), combined N/A 12/16/2015     Procedure: COMBINED ESOPHAGOSCOPY, GASTROSCOPY, DUODENOSCOPY (EGD);  Surgeon: Jens Robertson MD;  Location:  GI     Open reduction internal fixation hip nailing Left 4/21/2016     Procedure: OPEN REDUCTION INTERNAL FIXATION HIP NAILING;  Surgeon: Jeffery Contreras MD;  Location:  OR     Laparoscopic herniorrhaphy ventral N/A 6/29/2016     Procedure: LAPAROSCOPIC HERNIORRHAPHY VENTRAL;  Surgeon: Alejandro Franks MD;  Location:  OR     Laparotomy, lysis adhesions, combined N/A 6/29/2016     Procedure: COMBINED LAPAROTOMY, LYSIS ADHESIONS;  Surgeon: Alejandro Franks MD;  Location:  OR[KF1.3]       Prior to Admission Medications   Prior to Admission Medications   Prescriptions Last Dose Informant Patient Reported? Taking?   ACETAMINOPHEN PO prn med  Yes Yes   Sig: Take 650 mg by mouth every 6 hours as needed for pain    Cholecalciferol (VITAMIN D3 PO)  at 0800 [facility] Yes Yes   Sig: Take 2,000 Units by mouth daily   Cyanocobalamin (VITAMIN B-12 IJ) 2/6/2017 [facility] Yes Yes   Sig: Inject 1,000 mcg as directed every 30 days    MELATONIN PO  at 2000 [facility] Yes Yes   Sig: Take 3 mg by mouth At Bedtime    Pantoprazole Sodium (PROTONIX PO)  at 0730 [facility] Yes Yes   Sig: Take 40 mg by mouth every morning (before breakfast)   QUEtiapine Fumarate (SEROQUEL PO)  at 1600 [facility] Yes Yes   Sig: Take 25 mg by mouth daily At 4PM   QUEtiapine Fumarate (SEROQUEL PO) prn [facility] Yes Yes   Sig: Take 12.5 mg by mouth every 4 hours as needed   QUEtiapine Fumarate (SEROQUEL PO)  at 0800  Yes Yes   Sig: Take 12.5 mg by mouth every morning   diclofenac (VOLTAREN) 1 % GEL  Nursing Home Yes Yes   Sig: Place 2 g onto the skin 2 times daily To elbow   metoprolol (LOPRESSOR) 50 MG tablet   No Yes   Sig: Take 1 tablet (50 mg) by mouth 2 times daily   nystatin ([facility]STATIN) [facility]955[facility]   Sig: Apply bid to groin and under pannus   polyethylene glycol (MIRALAX/GLYCOLAX) powder  at 0800  Yes Yes   Sig: Take 17 g by mouth daily   senna-docusate (SENOKOT-S;PERICOLACE) 8.6-50 MG per tablet  at 0800 [facility] Yes Yes   Sig: Take 1 tablet by mouth daily Hold if loose stools    senna-docusate (SENOKOT-S;PERICOLACE) 8.6-50 MG per tablet prn med [facility] Yes Yes   Sig: Take 1 tablet by mouth daily as needed for constipation      Facility-Administered Medications: None     Allergies   Allergies   Allergen Reactions     Blood Transfusion Related (Informational Only) Other (See Comments)     Patient has a history of a clinically significant antibody against RBC antigens.  A delay in compatible RBCs may occur.       Social History   I have reviewed this patient's social history and updated it with pertinent information if needed. Mallorie Erickson[KF1.1]  reports that she has never smoked. She has never used  smokeless tobacco. She reports that she does not drink alcohol.[KF1.3]    Family History   I have reviewed this patient's family history and updated it with pertinent information if needed.[KF1.1]   Family History   Problem Relation Age of Onset     CANCER Mother 42     STOMACH[KF1.3]       Review of Systems   The 10 point Review of Systems is negative other than noted in the HPI     Physical Exam   Temp: 98.3  F (36.8  C) Temp src: Oral BP: (!) 195/91   Heart Rate: 76 Resp: 18 SpO2: 94 % O2 Device: None (Room air)    Vital Signs with Ranges  Temp:  [98.3  F (36.8  C)] 98.3  F (36.8  C)  Heart Rate:  [76] 76  Resp:  [18] 18  BP: (195)/(91) 195/91  SpO2:  [94 %] 94 %  125 lbs 0 oz    Constitutional: awake, alert, pale and she looks dry and is mumbling.  Is not able to give any history.  She seemed to be confused  Eyes: lids and lashes normal and pupils equal, round and reactive to light  ENT: Mucosa is extremely dry.  Hematologic / Lymphatic: no cervical lymphadenopathy and no supraclavicular lymphadenopathy  Respiratory: No increased work of breathing, good air exchange, clear to auscultation bilaterally, no crackles or wheezing  Cardiovascular: normal apical pulses , normal S1 and S2 and the heart sound is hyperdynamic and there is 2/6 systolic ejection murmur at left sternal border  GI: normal bowel sounds and non-distended  Skin: no bruising or bleeding and the skin is very dry  Musculoskeletal: There is no redness, warmth, or swelling of the joints.  Full range of motion noted.  Motor strength is 5 out of 5 all extremities bilaterally.  Tone is normal.  Neurologic: She is alert but confused.  She is not oriented to time or place.  She moves all extremities.  Review of system is limited due to her profound dementia and confusion  Neuropsychiatric: General: calm and poor eye contact  Level of consciousness: She is alert but confused and not oriented to time or place  Affect: Not able to assess  Orientation: She is  confused and not oriented to time or place    Data       Recent Labs  Lab 02/25/17  1454   WBC 7.6   HGB 9.6*   MCV 84         POTASSIUM 4.8   CHLORIDE 111*   CO2 23   BUN 36*   CR 2.26*   ANIONGAP 8   KENTON 8.6   *       No results found for this or any previous visit (from the past 24 hour(s)).[KF1.1]     Revision History        User Key Date/Time User Provider Type Action    > KF1.3 2/25/2017  4:23 PM Sravan Colindres MD Physician Sign     KF1.2 2/25/2017  4:08 PM Sravan Colindres MD Physician      KF1.1 2/25/2017  4:07 PM Sravan Colindres MD Physician                      Discharge Summaries      Discharge Summaries by Sugar Whitaker DO at 3/6/2017 10:30 AM     Author:  Sugar Whitaker DO Service:  Hospitalist Author Type:  Physician    Filed:  3/6/2017 10:59 AM Date of Service:  3/6/2017 10:30 AM Note Created:  3/6/2017 10:30 AM    Status:  Addendum :  Sugar Whitaker DO (Physician)         [facility]    Discharge Summary  Hospitalist    Date of Admission:  2/25/2017  Date of Discharge:  3/6/2017  Discharging Provider: Sugar Whitaker    Discharge Diagnoses   Acute metabolic encephalopathy due to JAY  Advanced Dementia / Delirium  Hypertension  Hypernatremia  Urinary retention  Recent fall  Chronic anemia    History of Present Illness   Mallorie Erickson is a 89 year old female with PMHx of progressive advanced chronic dementia, stage III CKD, hypertension, dyslipidemia and vitamin B12 deficiency who was admitted on 2/25/2017 with acute metabolic encephalopathy due to acute kidney injury. Her stay was complicated by hypertension with SBPs into the 200s.    Hospital Course   Mallorie Erickson was admitted on 2/25/2017.  The following problems were addressed during her hospitalization:    Acute metabolic encephalopathy due to JAY:   Baseline Cr seems to be around 1.0. Cr 2.26 on admission. Suspect hypovolemic JAY due to decreased PO intake  at her facility, compounded by possible adverse side effects of recently instituted Lexapro and also a recent fall. UA negative for infection and renal ultrasound negative for obstruction. Geovanna was 77.    Renal function initially improved after administration of IVFs. Lost IV access on 3/3 PM and unable to place new IV. Encouraged po intake. Cr variable, but had improved to 1.54 on the day of discharge.     Will need repeat BMP later this week for reevaluation of renal function and sodium.    Advanced Dementia / Delirium:  Has known progressive advanced chronic dementia. Has a limited care plan in place; family has declined brain imaging studies. PTA regimen includes Seroquel 12.5mg qAM, 25mg q4pm and 12.5mg q4h prn    Maintained on regimen of Seroquel this stay. Had some issues with sundowning. One episode required dose of Haldol. She was given Zyprexa for a separate episode though this resulted in increased lethargy. She also had an episode of lethargy after a dose of prn Seroquel. Her mentation improved and she was back to her baseline by the time of discharge. Lexapro was held this stay given side effects noted as an outpatient (reported as increased confusion and babbling), was not resumed at discharge.    Discharged back to her memory care unit.     Hypertension:  PTA meds: metoprolol 50mg BID. Have had difficult time managing BPs this stay, systolics up to 200s at times. Changes made to her meds this stay. Metoprolol was increased to 75mg BID. Also started on Norvasc 10mg daily and Hydralazine 25mg TID. BPs had notably improved by the time of discharge. Have written for BP checks twice daily at her facility. Should follow up with provider later this week to determine if medications need to be further adjusted.       Hypernatremia  Metabolic Acidosis with hypochloridemia:  Related to initial hydration with NS. IVFs changed from NS to D5W on 3/2 with noted improvement in Na. After IVFs were stopped, Na crept up  - was 146 on day of discharge. Acidosis resolved. Encouraged continued po intake. Recommended repeat BMP later this week to ensure electrolytes remain stable.      Urinary retention:  Has required multiple straight cath's this stay with upwards of 450cc on bladder this stay. Difficult situation as koehler may contribute to confusion and place her at risk for UTI, though note retention could be driving some agitation and hypertension. Was managed with periodic straight caths this stay. Had hoped to continue straight caths after discharge but after discussion with facility, they are unable to do this. Given she was retaining upwards of 700 ml on straight caths this stay, required placement of koehler catheter prior to discharge.       Recent fall:  Had significant LLE pain when working with therapy this stay. Pelvic xray on 22/8 was neg for fracture. Seen by PT, recommended discharge back to her memory care unit.[KW1.1]  Orders placed for home care services including PT/OT and RN.[KW1.2]        Chronic anemia:  Baseline hgb is around 9. Hgb remained stable this stay.      Irregular HR:  On exam today, HR regular though with brief periods of irregularity. No noted hx of arrhythmia. EKG showed sinus rhythm with PACs    Sugar Whitaker    Significant Results and Procedures   See imaging studies/labs below.    Code Status   DNR / DNI       Primary Care Physician   Everardo Tate    Physical Exam   Temp: 99  F (37.2  C) Temp src: Oral BP: 152/65 Pulse: 94 Heart Rate: 79 Resp: 18 SpO2: 96 % O2 Device: None (Room air)    Vitals:    03/04/17 0558 03/05/17 0558 03/06/17 0504   Weight: 58.3 kg (128 lb 8.5 oz) 58.1 kg (128 lb 1.4 oz) 55.9 kg (123 lb 3.8 oz)     Vital Signs with Ranges  Temp:  [96.2  F (35.7  C)-99  F (37.2  C)] 99  F (37.2  C)  Pulse:  [94] 94  Heart Rate:  [70-94] 79  Resp:  [14-20] 18  BP: (144-200)/(52-77) 152/65  SpO2:  [95 %-98 %] 96 %  I/O last 3 completed shifts:  In: 240 [P.O.:240]  Out:  1650 [Urine:1650]    General: Resting comfortably, pleasantly confused, not oriented to location/year, NAD  CVS: HRRR, on MGR  Respiratory: CTAB, no wheeze/rales/rhonchi  GI: S, NT, ND, +BS  Ext: no LE edema  Skin: Warm/dry    Discharge Disposition   Discharged to long-term care facility  Condition at discharge: Stable    Consultations This Hospital Stay   PHYSICAL THERAPY ADULT IP CONSULT  NUTRITION SERVICES ADULT IP CONSULT    Time Spent on this Encounter   I, Sugar Whitaker, personally saw the patient today and spent greater than 30 minutes discharging this patient.    Discharge Orders[KW1.1]     Home care nursing referral     Home Care PT Referral for Hospital Discharge     Home Care OT Referral for Hospital Discharge     Reason for your hospital stay   Management of your confusion and acute kidney injury. You improved after treatment with IV fluids. Your blood pressures remained elevated and you were started on 2 new blood pressure medications this stay.     Follow-up and recommended labs and tests    1. Follow up with PCP/facility physician in the next 3-5 days for reassessment of your blood pressure. Need repeat BMP to assess your sodium and kidney function.     Activity   Your activity upon discharge: activity as tolerated     Discharge Instructions   1. Should have blood pressure checked twice daily - in the morning and evening.   2. Given problems with urinary retention, patient should be straight cath'd twice daily. She may ultimately need placement of a koehler catheter.     Koehler catheter   To straight gravity drainage. Change catheter every 2 weeks and PRN for leaking or decreased uring output with signs of bladder distention. DO NOT change catheter without a specific MD order IF diagnosis of benign prostatic hypertrophy (BPH), neurogenic bladder, or other urological conditions     MD face to face encounter   Documentation of Face to Face and Certification for Home Health Services    I certify  that patient: Mallorie Erickson is under my care and that I, or a nurse practitioner or physician's assistant working with me, had a face-to-face encounter that meets the physician face-to-face encounter requirements with this patient on: 3/6/2017.    This encounter with the patient was in whole, or in part, for the following medical condition, which is the primary reason for home health care: advanced alzheimers dementia, hypertension, JAY, weakness/falls.    I certify that, based on my findings, the following services are medically necessary home health services: Nursing, Occupational Therapy and Physical Therapy.    My clinical findings support the need for the above services because: Nurse is needed: to assist with vital signs checks and medications management., Occupational Therapy Services are needed to assess and treat cognitive ability and address ADL safety due to impairment in advanced dementia. and Physical Therapy Services are needed to assess and treat the following functional impairments: advanced dementia and physical deconditioning.    Further, I certify that my clinical findings support that this patient is homebound (i.e. absences from home require considerable and taxing effort and are for medical reasons or Religion services or infrequently or of short duration when for other reasons) because: Mental health symptoms including: Patient gets lost or wanders to due to cognitive impairments...    Based on the above findings. I certify that this patient is confined to the home and needs intermittent skilled nursing care, physical therapy and/or speech therapy.  The patient is under my care, and I have initiated the establishment of the plan of care.  This patient will be followed by a physician who will periodically review the plan of care.  Physician/Provider to provide follow up care: Everardo Tate    Attending hospital physician (the Medicare certified [facility] provider): Sugar Baxter  Julianne  Physician Signature: See electronic signature associated with these discharge orders.  Date: 3/6/2017     DNR/DNI     Diet   Follow this diet upon discharge: Regular[KW1.2]       Discharge Medications   Current Discharge Medication List      START taking these medications    Details   hydrALAZINE (APRESOLINE) 25 MG tablet Take 1 tablet (25 mg) by mouth every 8 hours  Qty: 60 tablet, Refills: 0    Associated Diagnoses: Benign hypertension with chronic kidney disease, stage III      amLODIPine (NORVASC) 10 MG tablet Take 1 tablet (10 mg) by mouth daily  Qty: 30 tablet, Refills: 0    Associated Diagnoses: Benign hypertension with chronic kidney disease, stage III         CONTINUE these medications which have CHANGED    Details   metoprolol (LOPRESSOR) 50 MG tablet Take 1.5 tablets (75 mg) by mouth 2 times daily  Qty: 60 tablet, Refills: 0    Associated Diagnoses: Benign essential hypertension         CONTINUE these medications which have NOT CHANGED    Details   ACETAMINOPHEN PO Take 650 mg by mouth every 6 hours as needed for pain      polyethylene glycol (MIRALAX/GLYCOLAX) powder Take 17 g by mouth daily      !! QUEtiapine Fumarate (SEROQUEL PO) Take 12.5 mg by mouth every morning      !! senna-docusate (SENOKOT-S;PERICOLACE) 8.6-50 MG per tablet Take 1 tablet by mouth daily Hold if loose stools       !! senna-docusate (SENOKOT-S;PERICOLACE) 8.6-50 MG per tablet Take 1 tablet by mouth daily as needed for constipation      !! QUEtiapine Fumarate (SEROQUEL PO) Take 12.5 mg by mouth every 4 hours as needed      MELATONIN PO Take 3 mg by mouth At Bedtime       diclofenac (VOLTAREN) 1 % GEL Place 2 g onto the skin 2 times daily To elbow      Pantoprazole Sodium (PROTONIX PO) Take 40 mg by mouth every morning (before breakfast)      Cholecalciferol (VITAMIN D3 PO) Take 2,000 Units by mouth daily      !! QUEtiapine Fumarate (SEROQUEL PO) Take 25 mg by mouth daily At 4PM      nystatin (MYCOSTATIN) [id number] UNIT/GM POWD  Apply bid to groin and under pannus  Qty: 1 Bottle, Refills: 12    Comments: Nurses/ aides to apply until the rash is gone  Associated Diagnoses: Intertrigo      Cyanocobalamin (VITAMIN B-12 IJ) Inject 1,000 mcg as directed every 30 days     Associated Diagnoses: B12 nutritional deficiency       !! - Potential duplicate medications found. Please discuss with provider.        Allergies   Allergies   Allergen Reactions     Blood Transfusion Related (Informational Only) Other (See Comments)     Patient has a history of a clinically significant antibody against RBC antigens.  A delay in compatible RBCs may occur.     Data   Most Recent 3 CBC's:  Recent Labs   Lab Test  03/06/17   0732  03/05/17   0710  03/04/17   0736   WBC  6.0  6.4  5.5   HGB  8.4*  9.0*  8.7*   MCV  84  83  84   PLT  151  139*  135*      Most Recent 3 BMP's:  Recent Labs   Lab Test  03/06/17   0732  03/05/17   0710  03/04/17   0736   NA  146*  145*  143   POTASSIUM  3.8  4.0  3.6   CHLORIDE  116*  116*  116*   CO2  20  19*  18*   BUN  35*  37*  35*   CR  1.54*  1.71*  1.69*   ANIONGAP  10  10  9   KENTON  8.1*  8.2*  8.1*   GLC  102*  116*  114*     Most Recent 2 LFT's:  Recent Labs   Lab Test  02/26/17   1111  07/01/16   0740   AST  30  14   ALT  27  10   ALKPHOS  151*  98   BILITOTAL  0.7  0.3     Most Recent TSH, T4 and A1c Labs:  Recent Labs   Lab Test  02/26/17   1111  04/22/16   0955   TSH  3.06   --    A1C   --   6.3*     Results for orders placed or performed during the hospital encounter of 02/25/17   Retroperitoneal US    Narrative    EXAMINATION: US RENAL COMPLETE, 2/25/2017 4:27 PM     COMPARISON: None.    HISTORY: Renal failure, concern for obstruction.    FINDINGS:    Right kidney: Measures 11.1 cm in length. Parenchyma is of normal  thickness and echogenicity. No focal mass. No hydronephrosis. 4.5 cm  cyst in the upper pole.    Left kidney: Measures 9.8 cm in length. Parenchyma is of normal  thickness and echogenicity. No focal mass. No  hydronephrosis.     Bladder: Unremarkable.        Impression    IMPRESSION:  1.  Cyst in the upper pole the right kidney. Otherwise normal renal  ultrasound.    RICHY CHAVARRIA   XR Pelvis 1/2 Views    Narrative    PELVIS ONE TO TWO VIEWS February 28, 2017 6:32 PM     HISTORY: Left hip and pelvic pain on ambulation.    COMPARISON: None.       Impression    IMPRESSION: A frontal view of the pelvis demonstrates no definite  acute fracture or dislocation. Prior plate and screw fixation of the  proximal left humerus noted.    MANNIE DUMAS MD[KW1.1]          Revision History        User Key Date/Time User Provider Type Action    > KW1.2 3/6/2017 10:59 AM Sugar Whitaker DO Physician Addend     [N/A] 3/6/2017 10:48 AM Sugar Whitaker DO Physician Addend     KW1.1 3/6/2017 10:47 AM Sugar Whitaker DO Physician Sign                     Consult Notes      Consults by Rachael Hernandes at 2/28/2017  9:38 AM     Author:  Rachael Hernandes Service:  Nutrition Author Type:  Dietetic Intern    Filed:  2/28/2017  1:40 PM Date of Service:  2/28/2017  9:38 AM Note Created:  2/28/2017  9:37 AM    Status:  Attested :  Rachael Hernandes (Dietetic Intern)    Cosigner:  Jerica Braun RD, ARIADNA at 2/28/2017  2:52 PM         Consult Orders:    1. Nutrition Services Adult IP Consult [[id number]] ordered by Asia Perez MD at 02/27/17 1504           Attestation signed by Jerica Braun RD, LD at 2/28/2017  2:52 PM        I have reviewed the note below    Jerica Braun RD, ARIADNA  Clinical Dietitian - [facility]  Pager - [phone]                                 CLINICAL NUTRITION SERVICES  -  ASSESSMENT NOTE      RECOMMENDATIONS FOR MD/PROVIDER TO ORDER:[KF1.1]   As medically feasible, advance diet as tolerated[KF1.2]   Recommendations Ordered by Registered Dietitian (RD):[KF1.1]  Ensure Plus with meals  Daily multivitamin[KF1.2]    Future/Additional Recommendations:[KF1.1]  "  Continue to encourage intake[KF1.2]    Malnutrition:[KF1.1] Patient does not meet two of the criteria necessary for diagnosing malnutrition[KF1.2]      REASON FOR ASSESSMENT  Mallorie Erickson is a 89 year old female seen by Registered Dietitian for RN Consult - Limited PO intake this shift. Intake <25% of meals.      NUTRITION HISTORY[KF1.1]  -Unable to obtain nutrition history due to patients mental status with dementia and lack of family in the room.     Per chart review, the patient follows a regular diet at home. Patient was started on Lexapro 2 weeks ago, which is what family suspects led to her not eat.[KF1.2]      CURRENT NUTRITION ORDERS  Diet Order:     Full liquid    Room service with Assist    Current Intake/Tolerance:[KF1.1]  Per nursing, the patient has mostly had bites of food since admission[KF1.2].[KF1.1] However, today she had good intake h[KF1.2]ad 100% of tray.[KF1.1] Patient was open to trying supplements with her meals.[KF1.2]      PHYSICAL FINDINGS  Observed[KF1.1]  No nutrition-related physical findings observed[KF1.2]  Obtained from Chart/Interdisciplinary Team[KF1.1]  None noted[KF1.2]    ANTHROPOMETRICS  Height: 157.5 cm ([KF1.1]5' 2\"[KF1.3])  Weight: 58.6kg ([KF1.1]129 lbs 3.03 oz[KF1.3])[KF1.1]  Body mass index is 23.63 kg/(m^2).[KF1.3]  Weight Status:  Normal BMI  IBW: 50 kg  % IBW: 117%  Weight History: No weight loss noted, weight appears to trend between 126-130#[KF1.1]  Wt Readings from Last 10 Encounters:   02/26/17 58.6 kg (129 lb 3 oz)   07/14/16 59.1 kg (130 lb 6.4 oz)   07/13/16 58.2 kg (128 lb 3.2 oz)   07/11/16 60.3 kg (133 lb)   07/04/16 58.6 kg (129 lb 1.6 oz)   06/28/16 56.4 kg (124 lb 5.4 oz)   06/21/16 57.4 kg (126 lb 8 oz)   06/20/16 57.4 kg (126 lb 8 oz)   06/16/16 57.2 kg (126 lb)   06/09/16 57.4 kg (126 lb 8 oz)[KF1.4]       LABS  Sodium: 148 (H) on 2/28    MEDICATIONS  IVF: 50 mL/hr    Dosing Weight 58.6 kg (admission weight)    ASSESSED NUTRITION NEEDS:  Estimated " Energy Needs: 1933-1515 kcals (25-30 Kcal/Kg)  Justification: maintenance  Estimated Protein Needs: 57-70 grams protein (1-1.2 g pro/Kg)  Justification: maintenance  Estimated Fluid Needs: 5722-5556  mL (1 mL/Kcal)  Justification: maintenance or per provider pending fluid status    MALNUTRITION:  % Weight Loss:  None noted  % Intake:[KF1.1]  <75% for 2 weeks (non-severe)[KF1.2]  Subcutaneous Fat Loss:[KF1.1]  None noted[KF1.2]   Muscle Loss:[KF1.1] None noted[KF1.2]  Fluid Retention:[KF1.1]  None noted[KF1.2]    Malnutrition Diagnosis:[KF1.1] Patient does not meet two of the above criteria necessary for diagnosing malnutrition[KF1.2]    NUTRITION DIAGNOSIS:[KF1.1]  Inadequate oral intake[KF1.2] related to[KF1.1] decreased appetite[KF1.2] as evidenced by[KF1.1] consume on average <25% of meals for 3 days[KF1.2]      NUTRITION INTERVENTIONS  Recommendations / Nutrition Prescription[KF1.1]  -Advance diet as tolerated, encouraging intake   -Oral supplements to support intake  -Daily multivitamin with mineral for potential micronutrient deficiencies with minimal PO intake[KF1.2]    Implementation[KF1.1]  -[KF1.2]Nutrition education:[KF1.1] Per Provider order if indicated   -Medical Food Supplement-Ordered Ensure plus with meals  -Multivitamin/Mineral-ordered daily multivitamin with minerals[KF1.2]    Nutrition Goals[KF1.1]  Patient to consume % of meals TID and supplements  .[KF1.2]  MONITORING AND EVALUATION:[KF1.1]  Progress towards goals will be monitored and evaluated per protocol and Practice Guidelines    Rachael Galan  Dietetic Intern  Cardiac/Oncology Pager: [phone][KF1.2]                 Revision History        User Key Date/Time User Provider Type Action    > KF1.2 2/28/2017  1:40 PM Fast, Rachael GRACIA Dietetic Intern Sign     KF1.4 2/28/2017  9:41 AM FastRachael Dietetic Intern      KF1.3 2/28/2017  9:38 AM FastRachael Dietetic Intern      KF1.1 2/28/2017  9:37 AM Rachael Hernandes Dietetic Intern                       Progress Notes - Physician (Notes from 03/03/17 through 03/06/17)      Progress Notes by Katheryn Pierce LSW at 3/6/2017 11:16 AM     Author:  Katheryn Pierce LSW Service:  (none) Author Type:      Filed:  3/6/2017 11:30 AM Date of Service:  3/6/2017 11:16 AM Note Created:  3/6/2017 11:16 AM    Status:  Addendum :  Katheryn Pierce LSW ()         RAFAELA  I: RAFAELA was updated that patient would be d/cing today. RAFAELA called [facility] to ensure they can accept patient back with a koehler and an assist of 1. Chiquita [facility] RN, stated they can accept her back as all patient's cares are all inclusive but would prefer she d/c with home care orders for RN/OT/PT services. [facility] had asked that RAFAELA arrange services with [facility]. RAFAELA called patient's daughter to discuss this information. Patient's daughter is okay with patient d/cing today and is okay with [facility]. RAFAELA discussed transportation needs with daughter. Patient's daughter would like RAFAELA to arrange stretcher transport and is aware that patient may recieve a bill for transport. Patient does require a stretcher due to cognition as patient has advanced dementia. RAFAELA faxed PCS form. RAFAELA faxed orders to [facility]. RAFAELA will need to arrange home care with [facility].    P: RAFAELA will continue to follow and assist as needed.[SM1.1]    ADDENDUM  I: [facility]FAELA [facility] care to arrange [facility] care services for patient. RAFAELA faxed orders to [phone] for home RN/PT/OT services.[SM1.2]     LYLA Prasad   *[phone][SM1.1]       Revision History        User Key Date/Time User Provider Type Action    > SM1.2 3/6/2017 11:30 AM Katheryn Pierce LSW  Addend     SM1.1 3/6/2017 11:21 AM Katheryn Pierce LSW  Sign            Progress Notes by Jerica Braun, RD, LD at 3/6/2017  9:57 AM     Author:  Jerica Braun RD, LD Service:  Nutrition Author Type:  Registered Dietitian    Filed:  3/6/2017 10:06 AM Date  "of Service:  3/6/2017  9:57 AM Note Created:  3/6/2017  9:57 AM    Status:  Signed :  Jerica Braun RD, LD (Registered Dietitian)         CLINICAL NUTRITION SERVICES - REASSESSMENT NOTE          EVALUATION OF PROGRESS TOWARD GOALS   Diet:  Regular            Room Service with Assist            Ensure with meals    Visited with pt this morning - \"can you get me another glass of milk?\"  Pt had just finished breakfast - eggs, oatmeal, Ensure, milk, OJ  Got pt another milk and she drank it during visit  Flowsheets reflect pt ate 100% meals on (3/4), however, had no po intake yest 2' to increased lethargy  She is being seen daily for meal ordering assistance - ordering Ensure with every meal        NEW FINDINGS:    Working toward dc back to Memory Care    Previous Goals (2/28):   Patient to consume % of meals TID and supplements  Evaluation: Met    Previous Nutrition Diagnosis (2/28):   Inadequate oral intake related to decreased appetite as evidenced by consume on average <25% of meals for 3 days  Evaluation: Improving        CURRENT NUTRITION DIAGNOSIS[SJ1.1]  No nutrition diagnosis identified at this time[SJ1.2]     INTERVENTIONS  Recommendations / Nutrition Prescription[SJ1.1]  Regular diet  Nutrition supplements[SJ1.2]    Implementation[SJ1.1]  Continue with Ensure at meals[SJ1.2]    Goals[SJ1.1]  Pt to consume 75% meals/supplements[SJ1.2]      MONITORING AND EVALUATION:[SJ1.1]  Progress towards goals will be monitored and evaluated per protocol and Practice Guidelines[SJ1.2]           Revision History        User Key Date/Time User Provider Type Action    > SJ1.2 3/6/2017 10:06 AM Jerica Braun RD, LD Registered Dietitian Sign     SJ1.1 3/6/2017  9:57 AM Jerica Braun RD, LD Registered Dietitian             Progress Notes by Sugar Whitaker DO at 3/5/2017  1:13 PM     Author:  Sugar Whitaker DO Service:  Hospitalist Author Type:  Physician    Filed:  3/5/2017  " 2:20 PM Date of Service:  3/5/2017  1:13 PM Note Created:  3/5/2017  1:13 PM    Status:  Addendum :  Sugar Whitaker DO (Physician)         [facility]    Assessment & Plan   Mallorie Erickson is a 89 year old female with PMHx of progressive advanced chronic dementia, stage III CKD, hypertension, dyslipidemia and vitamin B12 deficiency who was admitted on 2/25/2017 with acute metabolic encephalopathy due to acute kidney injury.      Acute metabolic encephalopathy due to JAY:   Baseline Cr seems to be around 1.0. Cr 2.26 on admission. Suspect hypovolemic JAY due to decreased PO intake at her facility, compounded by possible adverse side effects of recently instituted Lexapro and also a recent fall. UA negative for infection and renal ultrasound negative for obstruction. Geovanna was 77.  -- renal function slowly improved after IVFs this stay, though now with upward trend since lost IV access lost 3/3 PM: 1.37 -> 1.69 -> 1.71 today  -- po intake remains minimal despite attempts at encouragement  -- will attempt to place new IV today and resume IVFs      Advanced Dementia / Delirium:  Has known progressive advanced chronic dementia. Has a limited care plan in place; family has declined brain imaging studies  PTA regimen includes Seroquel 12.5mg qAM, 25mg q4pm and 12.5mg q4h prn  -- conts on PTA regimen of sched and prn Seroquel  -- had episode of sundowning on 2/28 and required dose of IM Haldol  -- Zyprexa HS was added on 3/1 but resulted in significant lethargy so it was stopped  -- Lexapro held this stay given side effects noted as an outpatient (reported as increased confusion and babbling), do not anticipate resuming at discahrge      Hypernatremia, Metabolic Alkalosis with hypochloridemia:  Related to initial hydration with NS. IVFs changed from NS to D5W on 3/2 with noted improvement in Na.   -- Na has since normalized  -- mild metabolic acidosis persists   --  cont serial BMPs    Urinary retention:  Has required multiple straight cath's this stay with upwards of 450cc on bladder this stay.   -- difficult situation as koehler may contribute to confusion and place her at risk for UTI, though note retention could be driving some agitation and hypertension  -- no koehler for now, encourage ambulation and straight caths prn -> if she continues to retain large amounts of urine may need koehler       Recent fall:  Had significant LLE pain when working with therapy this stay. Pelvic xray on 22/8 was neg for fracture.       Hypertension:  PTA meds: metoprolol 50mg BID. Have had difficult time managing BPs this stay. Metoprolol was continued on admission.   -- allow for some permissive hypertension given her age, though BPs have consistently been 180-200s this stay  -- amlodipine 10mg daily started on 3/3  -- metoprolol increased to 75mg BID on 3/5  -- hydralazine 25mg TID also added 3/5      Chronic anemia:  Baseline hgb is around 9.   -- hgb stable, no s/sx of bleeding this stay[KW1.1]    Irregular HR:  On exam today, HR regular though with brief periods of irregularity. No noted hx of arrhythmia  -- will check EKG[KW1.2]    ADDENDUM: EKG shows SR w/PACs[KW1.3]    FEN:[KW1.1] resume[KW1.4] IVFs w/D5[KW1.5] 1/2NS @100ml/h[KW1.4], lytes stable, regular diet[KW1.1] once alert[KW1.5]  DVT Prophylaxis: PCDs  Code Status: DNR/DNI.[KW1.1] As[KW1.5] discussed between Dr. Stevens and[KW1.1] patient's[KW1.5] daughter[KW1.1]/[KW1.5]son-in-law on 2/28. They[KW1.1] also[KW1.5] discussed[KW1.1] p[KW1.5]alliative care and hospice care[KW1.1]. Family[KW1.5] would be open to these care modalities in the future if[KW1.1] Mallorie[KW1.5] gets sicker or her mental status deteriorates further. Her overall care plan for now is meant to focus predominantly on comfort and supportive care.    Disposition: Anticipate discharge back to memory care unit in the next 1-2d, pending stable renal function and improvement  in BP.[KW1.1]    Discussed with patient's daughter Queenie via phone.[KW1.4]     Sugar Bessdana Whitaker    Interval History[KW1.1]   Gi[KW1.4]kee dose of Seroquel overnight[KW1.1] dt agitation and restlessness[KW1.4]. More lethargic this morning. Hasn't been eating/drinking much. Continues to require straight caths[KW1.1], most recently cath'd for 400ml. Patient sleeping during my visit. Rouses to name and tactile stimuli. Isn't able to answer questions, quickly falls back asleep.[KW1.4]     -Data reviewed today: I reviewed all new labs and imaging results over the last 24 hours. I personally reviewed no images or EKG's today.    Physical Exam   Temp: 96.2  F (35.7  C) Temp src: Axillary BP: 185/77   Heart Rate: 82 Resp: 20 SpO2: 97 % O2 Device: None (Room air)    Vitals:    03/03/17 0523 03/04/17 0558 03/05/17 0558   Weight: 56 kg (123 lb 7.3 oz) 58.3 kg (128 lb 8.5 oz) 58.1 kg (128 lb 1.4 oz)     Vital Signs with Ranges  Temp:  [96.1  F (35.6  C)-96.3  F (35.7  C)] 96.2  F (35.7  C)  Heart Rate:  [] 82  Resp:  [18-20] 20  BP: (151-193)/(65-77) 185/77  SpO2:  [95 %-98 %] 97 %  I/O last 3 completed shifts:  In: 540 [P.O.:540]  Out: 525 [Urine:525]    Constitutional: Resting[KW1.1] quietly[KW1.4],[KW1.1] rouses to name/tactile stimuli but unable to answer questions and quickly falls back asleep,[KW1.4] NAD  Respiratory: CTAB, no wheeze/rales/rhonchi  Cardiovascular:[KW1.1] HRRR though with transient periods of irregularity[KW1.4], no MGR  GI: S, NT, +BS  Skin/Integumen: warm/dry  Other: trace bilateral LE edema    Medications     D5W Stopped (03/04/17 0012)       metoprolol  75 mg Oral BID     amLODIPine  10 mg Oral Daily     multivitamin, therapeutic with minerals  1 tablet Oral Daily     pantoprazole (PROTONIX) EC tablet 40 mg  40 mg Oral QAM AC     polyethylene glycol  17 g Oral Daily     QUEtiapine (SEROquel) tablet 25 mg  25 mg Oral Daily     QUEtiapine (SEROquel) half-tab 12.5 mg  12.5 mg Oral QAM      senna-docusate  1 tablet Oral Daily       Data     Recent Labs  Lab 03/05/17  0710 03/04/17  0736 03/03/17  0740   WBC 6.4 5.5 6.4   HGB 9.0* 8.7* 9.1*   MCV 83 84 85   * 135* 141*   * 143 148*   POTASSIUM 4.0 3.6 3.7   CHLORIDE 116* 116* 119*   CO2 19* 18* 20   BUN 37* 35* 30   CR 1.71* 1.69* 1.37*   ANIONGAP 10 9 9   KENTON 8.2* 8.1* 8.3*   * 114* 133*       No results found for this or any previous visit (from the past 24 hour(s)).[KW1.1]       Revision History        User Key Date/Time User Provider Type Action    > KW1.3 3/5/2017  2:20 PM Sugar Whitaker DO Physician Addend     KW1.2 3/5/2017  1:51 PM Sugar Whitaker DO Physician Addend     KW1.5 3/5/2017  1:49 PM Sugar Whitaker DO Physician Addend     KW1.4 3/5/2017  1:46 PM Sugar Whitaker DO Physician Sign     KW1.1 3/5/2017  1:13 PM Sugar Whitaker DO Physician             Progress Notes by Sugar Whitaker DO at 3/4/2017  9:40 AM     Author:  Sugar Whitaker DO Service:  Hospitalist Author Type:  Physician    Filed:  3/4/2017 10:10 AM Date of Service:  3/4/2017  9:40 AM Note Created:  3/4/2017  9:40 AM    Status:  Signed :  Sugar Whitaker DO (Physician)         [facility]    Hospitalist Progress Note[KW1.1]    Assessment & Plan[KW1.2]   Mallorie Erickson is a 89 year old female with PMHx of progressive advanced chronic dementia, stage III CKD, hypertension, dyslipidemia and vitamin B12 deficiency who was admitted on 2/25/2017 with acute metabolic encephalopathy due to acute kidney injury.      Acute metabolic encephalopathy due to JAY: Improving  Baseline Cr seems to be around 1.0. Cr 2.26 on admission. Suspect hypovolemic JAY due to decreased PO intake at her facility, compounded by possible adverse side effects of recently instituted Lexapro and also a recent fall. UA negative for infection and renal ultrasound negative for obstruction. Geovanna  was 77.  -- renal function slowly improving after IVFs this stay, though now with mild upward trend overnight: 1.37 -> 1.69  -- have lost IV access -> keep off IVFs for now and encourage po intake, if Cr continues to trend up will need to replace IV and resume IVFs       Advanced Dementia / Delirium:  Has known progressive advanced chronic dementia. Has a limited care plan in place; family has declined brain imaging studies  PTA regimen includes Seroquel 12.5mg qAM, 25mg q4pm and 12.5mg q4h prn  -- conts on PTA regimen of Seroquel including prn dosing  -- had episode of sundowning on 2/28 and required dose of IM Haldol  -- Zyprexa HS was added on 3/1 but resulted in significant lethargy so it was stopped  -- Lexapro held this stay given side effects noted as an outpatient, do not anticipate resuming at discahrge      Hypernatremia, Metabolic Alkalosis with hypochloridemia:  Related to initial hydration with NS. IVFs changed from NS to D5W on 3/2 with noted improvement in Na.   -- Na has since normalized  -- mild metabolic acidosis persists  -- cont serial BMPs    Urinary retention:  Has required multiple straight cath's this stay with upwards of 450cc on bladder this stay.   -- difficult situation as koehler may contribute to confusion and place her at risk for UTI, though note retention could be driving some agitation and hypertension  -- no koehler for now, encourage ambulation and straight caths prn -> if she continues to retain large amounts of urine may need koehler       Recent fall:  Had significant LLE pain when working with therapy this stay. Pelvic xray on 22/8 was neg for fracture.       Hypertension:  PTA meds: metoprolol 50mg BID.   -- Metoprolol continued on admission  -- BPs remained elevated this stay, into 200s at times, amlodipine 10mg daily started on 3/3  -- has prn hydralazine (IV/po)  -- monitor BPs today, allow for some permissive hypertension given her age -> if remain BPs elevated may need to add  scheduled Hydralazine      Chronic anemia:  Baseline hgb is around 9.   -- hgb stable, no s/sx of bleeding this stay,    FEN: lost IV access overnight, hold IVFs today and encourage po intake, lytes stable, regular diet  DVT Prophylaxis: PCDs  Code Status:[KW1.1] DNR/DNI[KW1.2]. This as discussed between Dr. Stevens and her daughter and son-in-law at the bedside on 2/28. They discussed Palliative care and hospice care and they would be open to these care modalities in the future if she gets sicker or her mental status deteriorates further. Her overall care plan for now is also meant to focus predominantly on comfort and supportive care.    Disposition: Anticipate discharge back to memory care unit tomorrow pending stable renal function and improvement in BP.[KW1.1]     Sugar Whitaker    Interval History[KW1.2]   Lost IV access overnight, unable to place new IV despite multiple attempts. Pleasantly confused this morning. Ate most of breakfast. No concerns per RN. BPs elevated this morning, >200 prior to morning meds.[KW1.3]    -Data reviewed today: I reviewed all new labs and imaging results over the last 24 hours. I personally reviewed no images or EKG's today.[KW1.1]    Physical Exam   Temp: 96  F (35.6  C) Temp src: Oral BP: 193/65   Heart Rate: 71 Resp: 16 SpO2: 97 % O2 Device: None (Room air)    Vitals:    03/02/17 0300 03/03/17 0523 03/04/17 0558   Weight: 55.9 kg (123 lb 3.8 oz) 56 kg (123 lb 7.3 oz) 58.3 kg (128 lb 8.5 oz)[KW1.2]     Vital Signs with Ranges[KW1.1]  Temp:  [96  F (35.6  C)-97.5  F (36.4  C)] 96  F (35.6  C)  Heart Rate:  [53-71] 71  Resp:  [16-18] 16  BP: (153-211)/(58-91) 193/65  SpO2:  [95 %-98 %] 97 %  I/O last 3 completed shifts:  In: 1370 [P.O.:810; I.V.:560]  Out: 250 [Urine:250][KW1.2]    Constitutional: Resting comfortably,[KW1.1] alert and pleasantly confused, not oriented to self/location/year, NAD[KW1.3]  Respiratory:[KW1.1] CTAB, no  wheeze/rales/rhonchi[KW1.3]  Cardiovascular:[KW1.1] HRRR, no MGR[KW1.3]  GI:[KW1.1] S, NT, +BS[KW1.3]  Skin/Integumen:[KW1.1] warm/dry[KW1.3]  Other:[KW1.1] trace bilateral LE edema[KW1.3]    Medications     D5W Stopped (03/04/17 0012)       amLODIPine  10 mg Oral Daily     sodium chloride (PF)  3 mL Intracatheter Q8H     multivitamin, therapeutic with minerals  1 tablet Oral Daily     metoprolol  50 mg Oral BID     pantoprazole (PROTONIX) EC tablet 40 mg  40 mg Oral QAM AC     polyethylene glycol  17 g Oral Daily     QUEtiapine (SEROquel) tablet 25 mg  25 mg Oral Daily     QUEtiapine (SEROquel) half-tab 12.5 mg  12.5 mg Oral QAM     senna-docusate  1 tablet Oral Daily       Data     Recent Labs  Lab 03/04/17  0736 03/03/17  0740 03/02/17 2020 03/02/17  0745  02/26/17  1111   WBC 5.5 6.4  --  5.7  < > 8.0   HGB 8.7* 9.1*  --  8.8*  < > 9.5*   MCV 84 85  --  85  < > 84   * 141*  --  152  < > 176    148* 147* 152*  < > 147*   POTASSIUM 3.6 3.7  --  3.8  < > 4.1   CHLORIDE 116* 119*  --  125*  < > 118*   CO2 18* 20  --  18*  < > 18*   BUN 35* 30  --  28  < > 31*   CR 1.69* 1.37*  --  1.44*  < > 2.17*   ANIONGAP 9 9  --  9  < > 11   KENTON 8.1* 8.3*  --  8.0*  < > 7.9*   * 133*  --  97  < > 112*   ALBUMIN  --   --   --   --   --  2.9*   PROTTOTAL  --   --   --   --   --  6.2*   BILITOTAL  --   --   --   --   --  0.7   ALKPHOS  --   --   --   --   --  151*   ALT  --   --   --   --   --  27   AST  --   --   --   --   --  30   < > = values in this interval not displayed.    No results found for this or any previous visit (from the past 24 hour(s)).[KW1.2]       Revision History        User Key Date/Time User Provider Type Action    > KW1.3 3/4/2017 10:10 AM Sugar Whitaker,  Physician Sign     KW1.2 3/4/2017  9:42 AM Sugar Whitaker DO Physician      KW1.1 3/4/2017  9:40 AM Sugar Whitaker DO Physician             Progress Notes by Beatrice Botello MD at 3/3/2017 11:54 PM      Author:  Beatrice Botello MD Service:  Hospitalist Author Type:  Physician    Filed:  3/3/2017 11:57 PM Date of Service:  3/3/2017 11:54 PM Note Created:  3/3/2017 11:54 PM    Status:  Signed :  Beatrice Botello MD (Physician)         Paged by RN regarding lost IV access. Patient with acute on chronic encephalopathy, day team in discussion with family regarding hospice. Plan trending toward comfort emphasis. Patient comfortable at this time. Only free water was being given via IV. OK to leave out tonight, further management per day team tomorrow.[SN1.1]     Revision History        User Key Date/Time User Provider Type Action    > SN1.1 3/3/2017 11:57 PM Beatrice Botello MD Physician Sign            Progress Notes by Chen Shepherd at 3/3/2017  9:09 AM     Author:  Chen Shepherd Service:  Social Work Author Type:      Filed:  3/3/2017 10:32 AM Date of Service:  3/3/2017  9:09 AM Note Created:  3/3/2017  9:09 AM    Status:  Addendum :  Chen Shepherd ()         Social Work Progress Note     D:   SW following for discharge planning. Per MD's note this AM, patient may be read to discharge back to her [facility] this weekend. Pt resides at [facility] (Address: [address] [place] [address]).   A/I: RAFAELA left message for RN manager, Chiquita, at [facility] in [place], [place] (ph:[phone]), requesting phone call back to clarify if they can take pt back on the weekend, where any new orders should be faxed to, and if new medications should be sent to a specific pharmacy.     P: Will continue to follow for support and d/c planning.      WALT Herbert [facility] [facility]  [phone]          Social Work Progress Note[NF1.1] - ADDENDUM[NF1.2]    D: [NF1.1]RAFAELA spoke with SHARON Porras, from [facility]. Chiquita confirms they do have staff that work on the weekend, however they might not have a RN on staff to accept and review orders.  Chiquita states she can be reached on her cell phone at [phone] when a discharge time is determined, and she may be able to come in to receive orders and patient.[NF1.2]     P:[NF1.1] SW will reach out to Chiquita RN, (ph:[phone]) when discharge is determined.[NF1.2]       Chen [facility] MSW [facility]  *[phone][NF1.1]       Revision History        User Key Date/Time User Provider Type Action    > NF1.2 3/3/2017 10:32 AM Chen [facility]  Addend     NF1.1 3/3/2017  9:14 AM Chen [facility]  Sign            Progress Notes by Keith Schneider MD at 3/3/2017  8:50 AM     Author:  Keith Schneider MD Service:  Hospitalist Author Type:  Physician    Filed:  3/3/2017  9:04 AM Date of Service:  3/3/2017  8:50 AM Note Created:  3/3/2017  8:50 AM    Status:  Signed :  Keith Schneider MD (Physician)         [facility]    Hospitalist Progress Note    Date of Service (when I saw the patient): 03/03/2017    Assessment & Plan   Mallorie Erickson is a 89 year old female with progressive advanced chronic dementia, CKD Stage 3, hypertension, dyslipidemia, and B12 deficiency who was admitted 2/25/2017 for evaluation of acute metabolic encephalopathy due to JAY.       Acute metabolic encephalopathy due to JAY - resolving  Creatinine 2.26 at admission, from baseline 1.01. Likely hypovolemic JAY due to low PO intake at her facility, compounded by possible adverse side effects of recently instituted Lexapro and also a recent fall. UA negative for infection and renal ultrasound negative for obstruction. Geovanna was 77.  -- Renal function has slowly improved with IV fluid resuscitation.   -- Cr 1.37 today.   -- C/w IVF and adjusted to D5W as below.      Dementia / Delirium  H/o progressive advanced chronic dementia. On Seroquel 12.5 mg Qam, 25 mg Q 4pm and 12.5 mg Q 4 prn PTA.   - Continued on am and afternoon dose on admit.   - She sun-downed 2/28 and required IM haldol. 3/1 was given  Zyprexa at bedtime but overnight was lethargic and hard to arouse and unable to take po meds.  - 3/2 stopped Zyprexa. C/w PTA Seroquel dosing and add back her Q 4 hr prn dosing.   - We are holding Lexapro for side effects noted as an outpatient; likely to stop this at discharge as well  -- Overall she has a limited care plan in place; her family have declined brain imaging tests     Hypernatremia  Metabolic Alkalosis with hypochloridemia  Related to NS IVF. Will stop NS and free water flushes and change fluids to D5W at 75 cc/hr for now.  - Follow BMP for improvement.   - Na[JR1.1] 152-->148 since D5 started.[JR1.2]     Urinary retention  Has required multiple straight cath's this stay.  Most recent last night for 450cc.   - Difficult situation as a koehler could be pulled during her confusion and put her at risk for UTI but her retention could also be driving some agitation and HTN.     - Will encourage Rn staff to ambulate her more and c/w straight cath for now.  Consider a koehler if no improvement.         Recent fall  Working with PT she had significant LLE pain.  -- Pelvic x-ray on 2/28 was negative for occult fracture      Hypertension  Resumed oral Metoprolol. Stopped nitro patch placed on amdit.  - Continues to be hypertensive with BP at times > 200.    - Add Norvasc 10 mg daily 3/3.  Titrate BB as tolerated.    - PRN Hydralazine also ordered.      Chronic anemia  HGB around 9; no signs of bleeding; monitor.         DVT Prophylaxis: Pneumatic Compression Devices     Code Status: DNR/DNI. It was discussed between Dr. Stevens and her daughter and son-in-law at the bedside on 2/28. They discussed Palliative care and hospice care and they would be open to these care modalities for Ms. Erickson in the future if she gets sicker or her mental status deteriorates further. Her overall care plan for now is also meant to focus predominantly on comfort and supportive care.      Disposition: Pending continued improvement in  her renal fxn and down trending of her NA levels. Improving. Add Norvasc for HTN.   Possibly tomorrow if the above improved.        Keith Phillips Schneider       Interval History   Hypertension continues to be a problem. Not as agitated last night but still with urinary retention needing straight cath. Na improving.       -Data reviewed today: I reviewed all new labs and imaging results over the last 24 hours. I personally reviewed no images or EKG's today.    Physical Exam   Temp: 97.7  F (36.5  C) Temp src: Oral BP: (!) 215/94   Heart Rate: 81 Resp: 16 SpO2: 95 % O2 Device: None (Room air)    Vitals:    02/26/17 0620 03/02/17 0300 03/03/17 0523   Weight: 58.6 kg (129 lb 3 oz) 55.9 kg (123 lb 3.8 oz) 56 kg (123 lb 7.3 oz)     Vital Signs with Ranges  Temp:  [96.2  F (35.7  C)-98.2  F (36.8  C)] 97.7  F (36.5  C)  Heart Rate:  [63-81] 81  Resp:  [16] 16  BP: (167-215)/(60-94) 215/94  SpO2:  [95 %-96 %] 95 %  I/O last 3 completed shifts:  In: 390 [P.O.:390]  Out: 700 [Urine:700]    Gen: Patient in no acute distress.  Appears comfortable. Pleasantly confused.   Heart:  S1S2+, regular rate and rhythm, No murmurs.  Lungs:  Clear to auscultation, no wheezing, no rales.   Abdomen:  Soft, non tender, non distended, bowel sounds positive.  Extremities:  No edema.    Medications     D5W 75 mL/hr at 03/03/17 0151       amLODIPine  10 mg Oral Daily     sodium chloride (PF)  3 mL Intracatheter Q8H     multivitamin, therapeutic with minerals  1 tablet Oral Daily     metoprolol  50 mg Oral BID     pantoprazole (PROTONIX) EC tablet 40 mg  40 mg Oral QAM AC     polyethylene glycol  17 g Oral Daily     QUEtiapine (SEROquel) tablet 25 mg  25 mg Oral Daily     QUEtiapine (SEROquel) half-tab 12.5 mg  12.5 mg Oral QAM     senna-docusate  1 tablet Oral Daily       Data     Recent Labs  Lab 03/03/17  0740 03/02/17 2020 03/02/17  0745 03/01/17  1405  02/26/17  1111   WBC 6.4  --  5.7 5.4  < > 8.0   HGB 9.1*  --  8.8* 9.0*  < > 9.5*   MCV  85  --  85 86  < > 84   *  --  152 154  < > 176   * 147* 152* 151*  < > 147*   POTASSIUM 3.7  --  3.8 3.9  < > 4.1   CHLORIDE 119*  --  125* 123*  < > 118*   CO2 20  --  18* 19*  < > 18*   BUN 30  --  28 27  < > 31*   CR 1.37*  --  1.44* 1.65*  < > 2.17*   ANIONGAP 9  --  9 9  < > 11   KENTON 8.3*  --  8.0* 8.1*  < > 7.9*   *  --  97 113*  < > 112*   ALBUMIN  --   --   --   --   --  2.9*   PROTTOTAL  --   --   --   --   --  6.2*   BILITOTAL  --   --   --   --   --  0.7   ALKPHOS  --   --   --   --   --  151*   ALT  --   --   --   --   --  27   AST  --   --   --   --   --  30   < > = values in this interval not displayed.    No results found for this or any previous visit (from the past 24 hour(s)).[JR1.1]     Revision History        User Key Date/Time User Provider Type Action    > JR1.2 3/3/2017  9:04 AM Keith Schneider MD Physician Sign     JR1.1 3/3/2017  8:50 AM Keith Schneider MD Physician                   Procedure Notes     No notes of this type exist for this encounter.      Progress Notes - Therapies (Notes from 03/03/17 through 03/06/17)     No notes of this type exist for this encounter.

## 2017-02-25 NOTE — PHARMACY-ADMISSION MEDICATION HISTORY
Admission medication history interview status for the 2/25/2017  admission is complete. See EPIC admission navigator for prior to admission medications     Medication history source reliability:Moderate    Actions taken by pharmacist (provider contacted, etc):Verified all medications with patient's NH list from [facility]. I was not able to confirm last dosages with patient's NH as they did not answer the phone this evening. Patient's family also did not know the last time she had been given medications.      Additional medication history information not noted on PTA med list :None    Medication reconciliation/reorder completed by provider prior to medication history? No    Time spent in this activity: 30 minutes    Prior to Admission medications    Medication Sig Last Dose Taking? Auth Provider   ACETAMINOPHEN PO Take 650 mg by mouth every 6 hours as needed for pain prn med Yes Unknown, Entered By History   polyethylene glycol (MIRALAX/GLYCOLAX) powder Take 17 g by mouth daily  at 0800 Yes Unknown, Entered By History   QUEtiapine Fumarate (SEROQUEL PO) Take 12.5 mg by mouth every morning  at 0800 Yes Unknown, Entered By History   metoprolol (LOPRESSOR) 50 MG tablet Take 1 tablet (50 mg) by mouth 2 times daily  Yes Noble Polo MD   senna-docusate (SENOKOT-S;PERICOLACE) 8.6-50 MG per tablet Take 1 tablet by mouth daily Hold if loose stools   at 0800 Yes Unknown, Entered By History   senna-docusate (SENOKOT-S;PERICOLACE) 8.6-50 MG per tablet Take 1 tablet by mouth daily as needed for constipation prn med Yes Unknown, Entered By History   QUEtiapine Fumarate (SEROQUEL PO) Take 12.5 mg by mouth every 4 hours as needed prn med Yes Unknown, Entered By History   MELATONIN PO Take 3 mg by mouth At Bedtime   at 2000 Yes Reported, Patient   diclofenac (VOLTAREN) 1 % GEL Place 2 g onto the skin 2 times daily To elbow  Yes Reported, Patient   Pantoprazole Sodium (PROTONIX PO) Take 40 mg by mouth every morning (before  breakfast)  at 0730 Yes Unknown, Entered By History   Cholecalciferol (VITAMIN D3 PO) Take 2,000 Units by mouth daily  at 0800 Yes Unknown, Entered By History   QUEtiapine Fumarate (SEROQUEL PO) Take 25 mg by mouth daily At 4PM  at 1600 Yes Unknown, Entered By History   nystatin (MYCOSTATIN) 699027 UNIT/GM POWD Apply bid to groin and under pannus  Yes Dimitrios Quinones MD   Cyanocobalamin (VITAMIN B-12 IJ) Inject 1,000 mcg as directed every 30 days  2/6/2017 Yes Reported, Patient

## 2017-02-25 NOTE — IP AVS SNAPSHOT
MRN:[id number]                      After Visit Summary   2/25/2017    Mallorie Erickson    MRN: [id number]           Thank you!     Thank you for choosing [facility] for your care. Our goal is always to provide you with excellent care. Hearing back from our patients is one way we can continue to improve our services. Please take a few minutes to complete the written survey that you may receive in the mail after you visit with us. Thank you!        Patient Information     Date Of Birth          3/25/1923        About your hospital stay     You were admitted on:  February 25, 2017 You last received care in the:  [place] [address] [facility]    You were discharged on:  March 6, 2017        Reason for your hospital stay       Management of your confusion and acute kidney injury. You improved after treatment with IV fluids. Your blood pressures remained elevated and you were started on 2 new blood pressure medications this stay.                  Who to Call     For medical emergencies, please call 911.  For non-urgent questions about your medical care, please call your primary care provider or clinic, [phone]          Attending Provider     Provider Specialty    Zoe Oswald MD Emergency Medicine    [facility]       Primary Care Provider Office Phone # Fax #    Everardo Obie Tate [phone]       [facility] [place] [address]        After Care Instructions     Activity       Your activity upon discharge: activity as tolerated            Diet       Follow this diet upon discharge: Regular            Discharge Instructions       1. Should have blood pressure checked twice daily - in the morning and evening.   2. Given problems with urinary retention, patient should be straight cath'd twice daily. She may ultimately need placement of a koehler catheter.            Koehler catheter       To straight gravity drainage. Change  catheter every 2 weeks and PRN for leaking or decreased uring output with signs of bladder distention. DO NOT change catheter without a specific MD order IF diagnosis of benign prostatic hypertrophy (BPH), neurogenic bladder, or other urological conditions                  Follow-up Appointments     Follow-up and recommended labs and tests        1. Follow up with PCP/facility physician in the next 3-5 days for reassessment of your blood pressure. Need repeat BMP to assess your sodium and kidney function.                  Additional Services     Home Care OT Referral for Hospital Discharge       OT to eval and treat    Your provider has ordered home care - occupational therapy. If you have not been contacted within 2 days of your discharge please call the department phone number listed on the top of this document.            Home Care PT Referral for Hospital Discharge       PT to eval and treat    Your provider has ordered home care - physical therapy. If you have not been contacted within 2 days of your discharge please call the department phone number listed on the top of this document.            Home care nursing referral       RN extended hours visit. RN to assess vital signs and weight and respiratory and cardiac status. Will need BMP checked on Thursday 3/9/17. Results to PCP.    Your provider has ordered home care nursing services. If you have not been contacted within 2 days of your discharge please call the inpatient department phone number at [phone]                  Further instructions from your care team       [facility] [phone], Fax: [phone]    Pending Results     Date and Time Order Name Status Description    3/5/2017 1338 EKG 12-lead, tracing only Preliminary             Statement of Approval     Ordered          03/06/17 1029  I have reviewed and agree with all the recommendations and orders detailed in this document.  EFFECTIVE NOW     Approved and electronically  "signed by:  Sugar Whitaker DO             Admission Information     Date & Time Provider Department Dept. Phone    2017 Sravan Colindres MD [place] [address] [facility] [phone]      Your Vitals Were     Blood Pressure Pulse Temperature Respirations Height Weight    158/55 (BP Location: Right arm) 94 99  F (37.2  C) (Oral) 18 1.575 m (5' 2\") 55.9 kg (123 lb 3.8 oz)    Pulse Oximetry BMI (Body Mass Index)                98% 22.54 kg/m2          [employer] Information     [employer] lets you send messages to your doctor, view your test results, renew your prescriptions, schedule appointments and more. To sign up, go to [email or web address] . Click on \"Log in\" on the left side of the screen, which will take you to the Welcome page. Then click on \"Sign up Now\" on the right side of the page.     You will be asked to enter the access code listed below, as well as some personal information. Please follow the directions to create your username and password.     Your access code is: [email or web address]  Expires: 2017 11:25 AM     Your access code will  in 90 days. If you need help or a new code, please call your [facility] clinic or [phone].        Care EveryWhere ID     This is your Care EveryWhere ID. This could be used by other organizations to access your [facility] medical records  [id number]           Review of your medicines      START taking        Dose / Directions    amLODIPine 10 MG tablet   Commonly known as:  NORVASC   Used for:  Benign hypertension with chronic kidney disease, stage III        Dose:  10 mg   Take 1 tablet (10 mg) by mouth daily   Quantity:  30 tablet   Refills:  0       hydrALAZINE 25 MG tablet   Commonly known as:  APRESOLINE   Used for:  Benign hypertension with chronic kidney disease, stage III        Dose:  25 mg   Take 1 tablet (25 mg) by mouth every 8 hours   Quantity:  60 tablet   Refills:  0         CONTINUE these medicines which may have CHANGED, or have new " prescriptions. If we are uncertain of the size of tablets/capsules you have at home, strength may be listed as something that might have changed.        Dose / Directions    metoprolol 50 MG tablet   Commonly known as:  LOPRESSOR   This may have changed:  how much to take   Used for:  Benign essential hypertension        Dose:  75 mg   Take 1.5 tablets (75 mg) by mouth 2 times daily   Quantity:  60 tablet   Refills:  0         CONTINUE these medicines which have NOT CHANGED        Dose / Directions    ACETAMINOPHEN PO        Dose:  650 mg   Take 650 mg by mouth every 6 hours as needed for pain   Refills:  0       diclofenac 1 % Gel topical gel   Commonly known as:  VOLTAREN        Dose:  2 g   Place 2 g onto the skin 2 times daily To elbow   Refills:  0       MELATONIN PO        Dose:  3 mg   Take 3 mg by mouth At Bedtime   Refills:  0       nystatin 180314 UNIT/GM Powd   Commonly known as:  MYCOSTATIN   Used for:  Intertrigo        Apply bid to groin and under pannus   Quantity:  1 Bottle   Refills:  12       polyethylene glycol powder   Commonly known as:  MIRALAX/GLYCOLAX        Dose:  17 g   Take 17 g by mouth daily   Refills:  0       PROTONIX PO        Dose:  40 mg   Take 40 mg by mouth every morning (before breakfast)   Refills:  0       * senna-docusate 8.6-50 MG per tablet   Commonly known as:  SENOKOT-S;PERICOLACE        Dose:  1 tablet   Take 1 tablet by mouth daily as needed for constipation   Refills:  0       * senna-docusate 8.6-50 MG per tablet   Commonly known as:  SENOKOT-S;PERICOLACE        Dose:  1 tablet   Take 1 tablet by mouth daily Hold if loose stools   Refills:  0       * SEROQUEL PO        Dose:  25 mg   Take 25 mg by mouth daily At 4PM   Refills:  0       * SEROQUEL PO   Indication:  aggitation        Dose:  12.5 mg   Take 12.5 mg by mouth every 4 hours as needed   Refills:  0       * SEROQUEL PO        Dose:  12.5 mg   Take 12.5 mg by mouth every morning   Refills:  0       VITAMIN B-12  IJ   Used for:  B12 nutritional deficiency        Dose:  1000 mcg   Inject 1,000 mcg as directed every 30 days   Refills:  0       VITAMIN D3 PO        Dose:  2000 Units   Take 2,000 Units by mouth daily   Refills:  0       * Notice:  This list has 5 medication(s) that are the same as other medications prescribed for you. Read the directions carefully, and ask your doctor or other care provider to review them with you.         Where to get your medicines      These medications were sent to [facility] [place], [place] - [address]  [address], [place] [address]     Phone:  [phone]     amLODIPine 10 MG tablet    hydrALAZINE 25 MG tablet    metoprolol 50 MG tablet                Protect others around you: Learn how to safely use, store and throw away your medicines at www.disposemymeds.org.             Medication List: This is a list of all your medications and when to take them. Check marks below indicate your daily home schedule. Keep this list as a reference.      Medications           Morning Afternoon Evening Bedtime As Needed    ACETAMINOPHEN PO   Take 650 mg by mouth every 6 hours as needed for pain   Last time this was given:  650 mg on 3/3/2017 11:39 PM                                amLODIPine 10 MG tablet   Commonly known as:  NORVASC   Take 1 tablet (10 mg) by mouth daily   Last time this was given:  10 mg on 3/6/2017  9:08 AM                                diclofenac 1 % Gel topical gel   Commonly known as:  VOLTAREN   Place 2 g onto the skin 2 times daily To elbow                                hydrALAZINE 25 MG tablet   Commonly known as:  APRESOLINE   Take 1 tablet (25 mg) by mouth every 8 hours   Last time this was given:  25 mg on 3/6/2017  1:45 PM                                MELATONIN PO   Take 3 mg by mouth At Bedtime                                metoprolol 50 MG tablet   Commonly known as:  LOPRESSOR   Take 1.5 tablets (75 mg) by mouth 2 times daily   Last  time this was given:  75 mg on 3/6/2017  9:06 AM                                nystatin [id number] UNIT/GM Powd   Commonly known as:  MYCOSTATIN   Apply bid to groin and under pannus                                polyethylene glycol powder   Commonly known as:  MIRALAX/GLYCOLAX   Take 17 g by mouth daily                                PROTONIX PO   Take 40 mg by mouth every morning (before breakfast)   Last time this was given:  40 mg on 3/6/2017  9:09 AM                                * senna-docusate 8.6-50 MG per tablet   Commonly known as:  SENOKOT-S;PERICOLACE   Take 1 tablet by mouth daily as needed for constipation   Last time this was given:  1 tablet on 3/6/2017  9:13 AM                                * senna-docusate 8.6-50 MG per tablet   Commonly known as:  SENOKOT-S;PERICOLACE   Take 1 tablet by mouth daily Hold if loose stools   Last time this was given:  1 tablet on 3/6/2017  9:13 AM                                * SEROQUEL PO   Take 25 mg by mouth daily At 4PM   Last time this was given:  12.5 mg on 3/6/2017  9:09 AM                                * SEROQUEL PO   Take 12.5 mg by mouth every 4 hours as needed   Last time this was given:  12.5 mg on 3/6/2017  9:09 AM                                * SEROQUEL PO   Take 12.5 mg by mouth every morning   Last time this was given:  12.5 mg on 3/6/2017  9:09 AM                                VITAMIN B-12 IJ   Inject 1,000 mcg as directed every 30 days                                VITAMIN D3 PO   Take 2,000 Units by mouth daily                                * Notice:  This list has 5 medication(s) that are the same as other medications prescribed for you. Read the directions carefully, and ask your doctor or other care provider to review them with you.

## 2017-02-25 NOTE — IP AVS SNAPSHOT
"[place] [address] ONCOLOGY: [phone]                                              INTERAGENCY TRANSFER FORM - PHYSICIAN ORDERS   2017                    Hospital Admission Date: 2017  SYLVESTER TEE   : 3/25/1923  Sex: Female        Attending Provider: Sravan Colindres MD     Allergies:  Blood Transfusion Related (Informational Only)    Infection:  None   Service:  HOSPITALIST    Ht:  1.575 m (5' 2\")   Wt:  55.9 kg (123 lb 3.8 oz)   Admission Wt:  56.7 kg (125 lb)    BMI:  22.54 kg/m 2   BSA:  1.56 m 2            Patient PCP Information     Provider PCP Type    Everardo Tate MD General      ED Clinical Impression     Diagnosis Description Comment Added By Time Added    Acute renal failure, unspecified acute renal failure type (H) [N17.9] Acute renal failure, unspecified acute renal failure type (H) [N17.9]  Zoe Oswald MD 2017  3:20 PM    Volume depletion [E86.9] Volume depletion [E86.9]  Zoe Oswald MD 2017  3:20 PM    Altered mental status, unspecified altered mental status type [R41.82] Altered mental status, unspecified altered mental status type [R41.82]  Zoe Oswald MD 2017  3:21 PM      Hospital Problems as of 3/6/2017              Priority Class Noted POA    Change in mental status Medium  2017 Yes      Non-Hospital Problems as of 3/6/2017              Priority Class Noted    Injury of kidney Medium  2012    Altered mental status Medium  2012    Essential hypertension Medium  2012    Anemia Medium  2012    Acute renal failure (H) Medium  2012    Bradycardia Medium  2012    History of colon cancer   Unknown    Hyperlipidemia with target LDL less than 100   Unknown    Benign hypertension with chronic kidney disease, stage III   Unknown    CKD (chronic kidney disease) stage 3, GFR 30-59 ml/min   Unknown    Anemia in chronic kidney disease (CKD)   2013    Acute posthemorrhagic anemia Medium  " 12/11/2015    Acute gastric ulcer Medium  1/22/2016    Intertrochanteric fracture of left hip (H) Medium  4/21/2016    Encounter for therapeutic drug monitoring Medium  5/4/2016    Alzheimer's dementia with behavioral disturbance Medium  5/25/2016    Small bowel obstruction (H) Medium  6/21/2016    ACP (advance care planning)   7/27/2016    Chronic coronary artery disease Medium  2/25/2017    Candidiasis of skin Medium  2/25/2017      Code Status History     Date Active Date Inactive Code Status Order ID Comments User Context    3/6/2017 10:29 AM  DNR/DNI [id number]  Sugar Whitaker DO Outpatient    2/25/2017  4:40 PM 3/6/2017 10:29 AM DNR/DNI [id number]  Sravan Colindres MD ED    7/3/2016 10:14 AM 2/25/2017  4:40 PM DNR/DNI [id number]  Noble Polo MD Outpatient    6/21/2016 11:31 PM 7/3/2016 10:14 AM DNR/DNI [id number]  Jeri Allan MD Inpatient    4/24/2016 10:02 AM 6/21/2016 11:31 PM DNR/DNI [id number]  Keegan Berumen MD Outpatient    4/23/2016  4:29 PM 4/24/2016 10:02 AM Full Code [id number]  Jeffery Contreras MD Outpatient    4/21/2016  1:11 PM 4/23/2016  4:29 PM DNR/DNI [id number]  Ronald Sims PA Inpatient    12/19/2015  7:02 AM 4/21/2016  1:11 PM DNR/DNI [id number]  Keith Schneider MD Outpatient    12/15/2015  9:56 PM 12/19/2015  7:02 AM DNR/DNI [id number]  Rafael Dawson MD Inpatient    12/10/2015  9:49 AM 12/15/2015  9:56 PM DNR/DNI [id number]  Dago Sanders MD Outpatient    12/8/2015 11:00 PM 12/10/2015  9:49 AM DNR/DNI [id number]  Juan Carlos Santana DO Inpatient    1/4/2013  7:26 PM 12/8/2015 11:00 PM DNR/DNI [id number]  Dimitrios Quinones MD Outpatient         Medication Review      START taking        Dose / Directions Comments    amLODIPine 10 MG tablet   Commonly known as:  NORVASC   Used for:  Benign hypertension with chronic kidney disease, stage III        Dose:  10 mg   Take 1 tablet (10 mg) by mouth daily   Quantity:  30 tablet   Refills:  0         hydrALAZINE 25 MG tablet   Commonly known as:  APRESOLINE   Used for:  Benign hypertension with chronic kidney disease, stage III        Dose:  25 mg   Take 1 tablet (25 mg) by mouth every 8 hours   Quantity:  60 tablet   Refills:  0          CONTINUE these medications which may have CHANGED, or have new prescriptions. If we are uncertain of the size of tablets/capsules you have at home, strength may be listed as something that might have changed.        Dose / Directions Comments    metoprolol 50 MG tablet   Commonly known as:  LOPRESSOR   This may have changed:  how much to take   Used for:  Benign essential hypertension        Dose:  75 mg   Take 1.5 tablets (75 mg) by mouth 2 times daily   Quantity:  60 tablet   Refills:  0          CONTINUE these medications which have NOT CHANGED        Dose / Directions Comments    ACETAMINOPHEN PO        Dose:  650 mg   Take 650 mg by mouth every 6 hours as needed for pain   Refills:  0        diclofenac 1 % Gel topical gel   Commonly known as:  VOLTAREN        Dose:  2 g   Place 2 g onto the skin 2 times daily To elbow   Refills:  0        MELATONIN PO        Dose:  3 mg   Take 3 mg by mouth At Bedtime   Refills:  0        nystatin 019889 UNIT/GM Powd   Commonly known as:  MYCOSTATIN   Used for:  Intertrigo        Apply bid to groin and under pannus   Quantity:  1 Bottle   Refills:  12    Nurses/ aides to apply until the rash is gone       polyethylene glycol powder   Commonly known as:  MIRALAX/GLYCOLAX        Dose:  17 g   Take 17 g by mouth daily   Refills:  0        PROTONIX PO        Dose:  40 mg   Take 40 mg by mouth every morning (before breakfast)   Refills:  0        * senna-docusate 8.6-50 MG per tablet   Commonly known as:  SENOKOT-S;PERICOLACE        Dose:  1 tablet   Take 1 tablet by mouth daily as needed for constipation   Refills:  0        * senna-docusate 8.6-50 MG per tablet   Commonly known as:  SENOKOT-S;PERICOLACE        Dose:  1 tablet   Take 1 tablet  by mouth daily Hold if loose stools   Refills:  0        * SEROQUEL PO        Dose:  25 mg   Take 25 mg by mouth daily At 4PM   Refills:  0        * SEROQUEL PO   Indication:  aggitation        Dose:  12.5 mg   Take 12.5 mg by mouth every 4 hours as needed   Refills:  0        * SEROQUEL PO        Dose:  12.5 mg   Take 12.5 mg by mouth every morning   Refills:  0        VITAMIN B-12 IJ   Used for:  B12 nutritional deficiency        Dose:  1000 mcg   Inject 1,000 mcg as directed every 30 days   Refills:  0        VITAMIN D3 PO        Dose:  2000 Units   Take 2,000 Units by mouth daily   Refills:  0        * Notice:  This list has 5 medication(s) that are the same as other medications prescribed for you. Read the directions carefully, and ask your doctor or other care provider to review them with you.              Further instructions from your care team       [facility] [phone], Fax: [phone]    Summary of Visit     Reason for your hospital stay       Management of your confusion and acute kidney injury. You improved after treatment with IV fluids. Your blood pressures remained elevated and you were started on 2 new blood pressure medications this stay.             After Care     Activity       Your activity upon discharge: activity as tolerated       Diet       Follow this diet upon discharge: Regular       Discharge Instructions       1. Should have blood pressure checked twice daily - in the morning and evening.   2. Given problems with urinary retention, patient should be straight cath'd twice daily. She may ultimately need placement of a koehler catheter.       Koehler catheter       To straight gravity drainage. Change catheter every 2 weeks and PRN for leaking or decreased uring output with signs of bladder distention. DO NOT change catheter without a specific MD order IF diagnosis of benign prostatic hypertrophy (BPH), neurogenic bladder, or other urological conditions              Referrals     Home Care OT Referral for Hospital Discharge       OT to eval and treat    Your provider has ordered home care - occupational therapy. If you have not been contacted within 2 days of your discharge please call the department phone number listed on the top of this document.       Home Care PT Referral for Hospital Discharge       PT to eval and treat    Your provider has ordered home care - physical therapy. If you have not been contacted within 2 days of your discharge please call the department phone number listed on the top of this document.       Home care nursing referral       RN extended hours visit. RN to assess vital signs and weight and respiratory and cardiac status. Will need BMP checked on Thursday 3/9/17. Results to PCP.    Your provider has ordered home care nursing services. If you have not been contacted within 2 days of your discharge please call the inpatient department phone number at [phone] .              MD face to face encounter       Documentation of Face to Face and Certification for Home Health Services    I certify that patient: Mallorie Erickson is under my care and that I, or a nurse practitioner or physician's assistant working with me, had a face-to-face encounter that meets the physician face-to-face encounter requirements with this patient on: 3/6/2017.    This encounter with the patient was in whole, or in part, for the following medical condition, which is the primary reason for home health care: advanced alzheimers dementia, hypertension, JAY, weakness/falls.    I certify that, based on my findings, the following services are medically necessary home health services: Nursing, Occupational Therapy and Physical Therapy.    My clinical findings support the need for the above services because: Nurse is needed: to assist with vital signs checks and medications management., Occupational Therapy Services are needed to assess and treat cognitive ability and address ADL  safety due to impairment in advanced dementia. and Physical Therapy Services are needed to assess and treat the following functional impairments: advanced dementia and physical deconditioning.    Further, I certify that my clinical findings support that this patient is homebound (i.e. absences from home require considerable and taxing effort and are for medical reasons or [employer] services or infrequently or of short duration when for other reasons) because: Mental health symptoms including: Patient gets lost or wanders to due to cognitive impairments...    Based on the above findings. I certify that this patient is confined to the home and needs intermittent skilled nursing care, physical therapy and/or speech therapy.  The patient is under my care, and I have initiated the establishment of the plan of care.  This patient will be followed by a physician who will periodically review the plan of care.  Physician/Provider to provide follow up care: Everardo Tate    Attending hospital physician (the Medicare certified [facility] provider): Sugar Whitaker  Physician Signature: See electronic signature associated with these discharge orders.  Date: 3/6/2017                  Follow-Up Appointment Instructions     Future Labs/Procedures    Follow-up and recommended labs and tests      Comments:    1. Follow up with PCP/facility physician in the next 3-5 days for reassessment of your blood pressure. Need repeat BMP to assess your sodium and kidney function.      Follow-Up Appointment Instructions     Follow-up and recommended labs and tests        1. Follow up with PCP/facility physician in the next 3-5 days for reassessment of your blood pressure. Need repeat BMP to assess your sodium and kidney function.             Statement of Approval     Ordered          03/06/17 1029  I have reviewed and agree with all the recommendations and orders detailed in this document.  EFFECTIVE NOW     Approved and  electronically signed by:  Sugar Whitaker,

## 2017-02-25 NOTE — ED NOTES
Iv attempted x3 unsuccessful. Will have lab come and collect her labs. Then have another RN attempt IV.

## 2017-02-25 NOTE — IP AVS SNAPSHOT
"    [place] [address] ONCOLOGY: [phone]                                              INTERAGENCY TRANSFER FORM - LAB / IMAGING / EKG / EMG RESULTS   2017                    Hospital Admission Date: 2017  SYLVESTER TEE   : 3/25/1923  Sex: Female        Attending Provider: Sravan Colindres MD     Allergies:  Blood Transfusion Related (Informational Only)    Infection:  None   Service:  HOSPITALIST    Ht:  1.575 m (5' 2\")   Wt:  55.9 kg (123 lb 3.8 oz)   Admission Wt:  56.7 kg (125 lb)    BMI:  22.54 kg/m 2   BSA:  1.56 m 2            Patient PCP Information     Provider PCP Type    Everardo Tate MD General         Lab Results - 3 Days      Basic metabolic panel [[id number]] (Abnormal)  Resulted: 17 0758, Result status: Final result    Ordering provider: Sugar Whitaker DO  17 0000 Resulting lab: [facility]    Specimen Information    Type Source Collected On   Blood  17 0732          Components       Value Reference Range Flag Lab   Sodium 146 133 - 144 mmol/L H FrStHsLb   Potassium 3.8 3.4 - 5.3 mmol/L  FrStHsLb   Chloride 116 94 - 109 mmol/L H FrStHsLb   Carbon Dioxide 20 20 - 32 mmol/L  FrStHsLb   Anion Gap 10 3 - 14 mmol/L  FrStHsLb   Glucose 102 70 - 99 mg/dL H FrStHsLb   Urea Nitrogen 35 7 - 30 mg/dL H FrStHsLb   Creatinine 1.54 0.52 - 1.04 mg/dL H FrStHsLb   GFR Estimate 31 >60 mL/min/1.7m2 L FrStHsLb   Comment:  Non  GFR Calc   GFR Estimate If Black 38 >60 mL/min/1.7m2 L FrStHsLb   Comment:  African American GFR Calc   Calcium 8.1 8.5 - 10.1 mg/dL L FrStHsLb            CBC with platelets [[id number]] (Abnormal)  Resulted: 17 0746, Result status: Final result    Ordering provider: Sugar Whitaker DO  17 0000 Resulting lab: [facility]    Specimen Information    Type Source Collected On   Blood  17 0732          Components       Value Reference Range Flag Lab   WBC 6.0 4.0 - 11.0 " 10e9/L  FrStHsLb   RBC Count 3.13 3.8 - 5.2 10e12/L L FrStHsLb   Hemoglobin 8.4 11.7 - 15.7 g/dL L FrStHsLb   Hematocrit 26.2 35.0 - 47.0 % L FrStHsLb   MCV 84 78 - 100 fl  FrStHsLb   MCH 26.8 26.5 - 33.0 pg  FrStHsLb   MCHC 32.1 31.5 - 36.5 g/dL  FrStHsLb   RDW 16.0 10.0 - 15.0 % H FrStHsLb   Platelet Count 151 150 - 450 10e9/L  FrStHsLb            Basic metabolic panel [[id number]] (Abnormal)  Resulted: 03/05/17 0737, Result status: Final result    Ordering provider: Sugar Whitaker DO  03/05/17 0000 Resulting lab: [facility]    Specimen Information    Type Source Collected On   Blood  03/05/17 0710          Components       Value Reference Range Flag Lab   Sodium 145 133 - 144 mmol/L H FrStHsLb   Potassium 4.0 3.4 - 5.3 mmol/L  FrStHsLb   Chloride 116 94 - 109 mmol/L H FrStHsLb   Carbon Dioxide 19 20 - 32 mmol/L L FrStHsLb   Anion Gap 10 3 - 14 mmol/L  FrStHsLb   Glucose 116 70 - 99 mg/dL H FrStHsLb   Urea Nitrogen 37 7 - 30 mg/dL H FrStHsLb   Creatinine 1.71 0.52 - 1.04 mg/dL H FrStHsLb   GFR Estimate 28 >60 mL/min/1.7m2 L FrStHsLb   Comment:  Non  GFR Calc   GFR Estimate If Black 34 >60 mL/min/1.7m2 L FrStHsLb   Comment:  African American GFR Calc   Calcium 8.2 8.5 - 10.1 mg/dL L FrStHsLb            CBC with platelets differential [[id number]] (Abnormal)  Resulted: 03/05/17 0721, Result status: Final result    Ordering provider: Sravan Colindres MD  03/05/17 0000 Resulting lab: [facility]    Specimen Information    Type Source Collected On   Blood  03/05/17 0710          Components       Value Reference Range Flag Lab   WBC 6.4 4.0 - 11.0 10e9/L  FrStHsLb   RBC Count 3.33 3.8 - 5.2 10e12/L L FrStHsLb   Hemoglobin 9.0 11.7 - 15.7 g/dL L FrStHsLb   Hematocrit 27.7 35.0 - 47.0 % L FrStHsLb   MCV 83 78 - 100 fl  FrStHsLb   MCH 27.0 26.5 - 33.0 pg  FrStHsLb   MCHC 32.5 31.5 - 36.5 g/dL  FrStHsLb   RDW 15.8 10.0 - 15.0 % H FrStHsLb   Platelet Count 139 150 -  450 10e9/L L FrStHsLb   Diff Method Automated Method   FrStHsLb   % Neutrophils 68.6 %  FrStHsLb   % Lymphocytes 21.2 %  FrStHsLb   % Monocytes 6.4 %  FrStHsLb   % Eosinophils 3.0 %  FrStHsLb   % Basophils 0.5 %  FrStHsLb   % Immature Granulocytes 0.3 %  FrStHsLb   Nucleated RBCs 0 0 /100  FrStHsLb   Absolute Neutrophil 4.4 1.6 - 8.3 10e9/L  FrStHsLb   Absolute Lymphocytes 1.4 0.8 - 5.3 10e9/L  FrStHsLb   Absolute Monocytes 0.4 0.0 - 1.3 10e9/L  FrStHsLb   Absolute Eosinophils 0.2 0.0 - 0.7 10e9/L  FrStHsLb   Absolute Basophils 0.0 0.0 - 0.2 10e9/L  FrStHsLb   Abs Immature Granulocytes 0.0 0 - 0.4 10e9/L  FrStHsLb   Absolute Nucleated RBC 0.0   FrStHsLb            Basic metabolic panel [[id number]] (Abnormal)  Resulted: 03/04/17 0813, Result status: Final result    Ordering provider: Keith Schneider MD  03/04/17 0000 Resulting lab: [facility]    Specimen Information    Type Source Collected On   Blood  03/04/17 0736          Components       Value Reference Range Flag Lab   Sodium 143 133 - 144 mmol/L  FrStHsLb   Potassium 3.6 3.4 - 5.3 mmol/L  FrStHsLb   Chloride 116 94 - 109 mmol/L H FrStHsLb   Carbon Dioxide 18 20 - 32 mmol/L L FrStHsLb   Anion Gap 9 3 - 14 mmol/L  FrStHsLb   Glucose 114 70 - 99 mg/dL H FrStHsLb   Urea Nitrogen 35 7 - 30 mg/dL H FrStHsLb   Creatinine 1.69 0.52 - 1.04 mg/dL H FrStHsLb   GFR Estimate 28 >60 mL/min/1.7m2 L FrStHsLb   Comment:  Non  GFR Calc   GFR Estimate If Black 34 >60 mL/min/1.7m2 L FrStHsLb   Comment:  African American GFR Calc   Calcium 8.1 8.5 - 10.1 mg/dL L FrStHsLb            CBC with platelets differential [[id number]] (Abnormal)  Resulted: 03/04/17 0802, Result status: Final result    Ordering provider: Sravan Colindres MD  03/04/17 0000 Resulting lab: [facility]    Specimen Information    Type Source Collected On   Blood  03/04/17 0736          Components       Value Reference Range Flag Lab   WBC 5.5 4.0 - 11.0  10e9/L  FrStHsLb   RBC Count 3.22 3.8 - 5.2 10e12/L L FrStHsLb   Hemoglobin 8.7 11.7 - 15.7 g/dL L FrStHsLb   Hematocrit 27.0 35.0 - 47.0 % L FrStHsLb   MCV 84 78 - 100 fl  FrStHsLb   MCH 27.0 26.5 - 33.0 pg  FrStHsLb   MCHC 32.2 31.5 - 36.5 g/dL  FrStHsLb   RDW 15.9 10.0 - 15.0 % H FrStHsLb   Platelet Count 135 150 - 450 10e9/L L FrStHsLb   Diff Method Automated Method   FrStHsLb   % Neutrophils 63.2 %  FrStHsLb   % Lymphocytes 23.8 %  FrStHsLb   % Monocytes 6.6 %  FrStHsLb   % Eosinophils 5.3 %  FrStHsLb   % Basophils 0.7 %  FrStHsLb   % Immature Granulocytes 0.4 %  FrStHsLb   Nucleated RBCs 0 0 /100  FrStHsLb   Absolute Neutrophil 3.5 1.6 - 8.3 10e9/L  FrStHsLb   Absolute Lymphocytes 1.3 0.8 - 5.3 10e9/L  FrStHsLb   Absolute Monocytes 0.4 0.0 - 1.3 10e9/L  FrStHsLb   Absolute Eosinophils 0.3 0.0 - 0.7 10e9/L  FrStHsLb   Absolute Basophils 0.0 0.0 - 0.2 10e9/L  FrStHsLb   Abs Immature Granulocytes 0.0 0 - 0.4 10e9/L  FrStHsLb   Absolute Nucleated RBC 0.0   FrStHsLb            Basic metabolic panel [[id number]] (Abnormal)  Resulted: 03/03/17 0812, Result status: Final result    Ordering provider: Keith Schneider MD  03/03/17 0000 Resulting lab: [facility]    Specimen Information    Type Source Collected On   Blood  03/03/17 0740          Components       Value Reference Range Flag Lab   Sodium 148 133 - 144 mmol/L H FrStHsLb   Potassium 3.7 3.4 - 5.3 mmol/L  FrStHsLb   Chloride 119 94 - 109 mmol/L H FrStHsLb   Carbon Dioxide 20 20 - 32 mmol/L  FrStHsLb   Anion Gap 9 3 - 14 mmol/L  FrStHsLb   Glucose 133 70 - 99 mg/dL H FrStHsLb   Urea Nitrogen 30 7 - 30 mg/dL  FrStHsLb   Creatinine 1.37 0.52 - 1.04 mg/dL H FrStHsLb   GFR Estimate 36 >60 mL/min/1.7m2 L FrStHsLb   Comment:  Non  GFR Calc   GFR Estimate If Black 43 >60 mL/min/1.7m2 L FrStHsLb   Comment:  African American GFR Calc   Calcium 8.3 8.5 - 10.1 mg/dL L FrStHsLb            CBC with platelets differential [[id number]]  (Abnormal)  Resulted: 03/03/17 0802, Result status: Final result    Ordering provider: Sravan Colindres MD  03/03/17 0000 Resulting lab: [facility]    Specimen Information    Type Source Collected On   Blood  03/03/17 0740          Components       Value Reference Range Flag Lab   WBC 6.4 4.0 - 11.0 10e9/L  FrStHsLb   RBC Count 3.39 3.8 - 5.2 10e12/L L FrStHsLb   Hemoglobin 9.1 11.7 - 15.7 g/dL L FrStHsLb   Hematocrit 28.8 35.0 - 47.0 % L FrStHsLb   MCV 85 78 - 100 fl  FrStHsLb   MCH 26.8 26.5 - 33.0 pg  FrStHsLb   MCHC 31.6 31.5 - 36.5 g/dL  FrStHsLb   RDW 16.3 10.0 - 15.0 % H FrStHsLb   Platelet Count 141 150 - 450 10e9/L L FrStHsLb   Diff Method Automated Method   FrStHsLb   % Neutrophils 66.3 %  FrStHsLb   % Lymphocytes 20.3 %  FrStHsLb   % Monocytes 8.2 %  FrStHsLb   % Eosinophils 4.2 %  FrStHsLb   % Basophils 0.5 %  FrStHsLb   % Immature Granulocytes 0.5 %  FrStHsLb   Nucleated RBCs 0 0 /100  FrStHsLb   Absolute Neutrophil 4.2 1.6 - 8.3 10e9/L  FrStHsLb   Absolute Lymphocytes 1.3 0.8 - 5.3 10e9/L  FrStHsLb   Absolute Monocytes 0.5 0.0 - 1.3 10e9/L  FrStHsLb   Absolute Eosinophils 0.3 0.0 - 0.7 10e9/L  FrStHsLb   Absolute Basophils 0.0 0.0 - 0.2 10e9/L  FrStHsLb   Abs Immature Granulocytes 0.0 0 - 0.4 10e9/L  FrStHsLb   Absolute Nucleated RBC 0.0   FrStHsLb            Testing Performed By     Lab - Abbreviation Name Director Address Valid Date Range    14 - FrStHsLb [facility] Unknown [address] [place] [address] 05/08/15 1057 - Present            Unresulted Labs     None      Encounter-Level Documents:     There are no encounter-level documents.      Order-Level Documents:     There are no order-level documents.

## 2017-02-25 NOTE — IP AVS SNAPSHOT
` ` Patient Information     Patient Name Sex     Mallorie Erickson ([id number]) Female 3/25/1923       Room Bed    8830 2730-02      Patient Demographics     Address Phone    [address]  [facility] [id number] [phone] (Home)  None (Work)  none (Mobile)      Patient Ethnicity & Race     Ethnic Group Patient Race    American White      Emergency Contact(s)     Name Relation Home Work Mobile    DAWSON BOONE Daughter [phone] NONE [phone]    Dr. ERICKSONLAINEY [phone]      Documents on File        Status Date Received Description       Documents for the Patient    Consent for Services - [facility] Received () 13 RMG-CONSENT    Privacy Notice - [facility] Received 13 RMG-PRIVACY    Insurance Card Received 16     Patient ID       External Medication Information Consent Accepted 13 RMG-EXTERNAL    Insurance Card Received () 13 RMG-BANKERS    Insurance Card Received 09/11/15 RMG-MEDICARE    Consent for EHR Access  13 Copied from existing Consent for services - RMG collected on 2013    Insurance Card Received () 13 RMG-MHCP    Advance Directives and Living Will Received 13 POLST 13    Consent for Services - RMG Received () 14 RMG-CONSENT    Insurance Card Received () 14 RMG-BANKERS    Insurance Card Received () 14 RMG-MHCP    Consent for Services - [facility]MG Received () 04/21/15 RMG-CONSENT    HIM ANGELINA Authorization - File Only   10/2/15 MEDICAL RELEASE TO [facility]    HIM ANGELINA Authorization - File Only   11/16/15 MEDICAL RELEASE TO [facility]    Consent for Services - Hospital/Clinic Received () 12/08/15     Privacy Notice - [place] Received 12/08/15     [facility] Specified Other       Advance Directives and Living Will Received 12/28/15 Health Care Directive 04    Advance Directives and Living Will Not Received  Validation of AD 04    HIM ANGELINA  Authorization  01/18/16 [facility]  01/18/2016    Consent for Services/Privacy Notice - Hospital/Clinic       Advance Directives and Living Will Received 05/02/16 POLST  02/06/2013    Consent for Services/Privacy Notice - Hospital/Clinic  05/23/16 CONSENT FOR SERVICE    Advance Directives and Living Will Received 07/29/16 POLST  07/26/16    Business/Insurance/Care Coordination/Health Form - Patient Received (Deleted) 04/22/16 [employer]BS COB       Documents for the Encounter    CMS IM for Patient Signature Received 03/01/17 1MM    CMS IM for Patient Signature Received 03/04/17 2MM      Admission Information     Attending Provider Admitting Provider Admission Type Admission Date/Time    Sravan Colindres MD Farbakhsh, Kambiz, MD Emergency 02/25/17  1237    Discharge Date Hospital Service Auth/Cert Status Service Area     Hospitalist [facility] SERVICES    Unit Room/Bed Admission Status        88 ONCOLOGY 8830/8830-02 Admission (Confirmed)       Admission     Complaint    Change in mental status      Hospital Account     Name Acct ID Class Status Primary Coverage    Mallorie Erickson [id number] Inpatient Open MEDICARE - MEDICARE            Guarantor Account (for Hospital Account #[id number])     Name Relation to Pt Service Area Active? Acct Type    Georgina Mallorie  FCS Yes Personal/Family    Address Phone          [address]  [place] [address] [phone](H)  None(O)              Coverage Information (for Hospital Account #[id number])     1. MEDICARE/MEDICARE     F/O Payor/Plan Precert #    MEDICARE/MEDICARE     Subscriber Subscriber #    Georgina Mallorie [id number]    Address Phone    [employer]  [address]  [place] [address] [phone]          2. COMMERCIAL/BANKERS LIFE     F/O Payor/Plan Precert #    COMMERCIAL/BANKERS LIFE     Subscriber Subscriber #    Georgina Mallorie [id number]    Address Phone    [address]  [place] [address] [phone]

## 2017-02-25 NOTE — ED NOTES
Bed: ED19  Expected date:   Expected time:   Means of arrival:   Comments:  417 - 89 f confusion eta 1220

## 2017-02-25 NOTE — ED PROVIDER NOTES
"  History     Chief Complaint:  Altered mental status     HPI   History is somewhat limited due to patient's altered mental status:    Mallorie Erickson is a 89 year old female with a history of Alzheimer's dementia who presents with altered mental status. The patient's daughter and son-in-law state that she was recently started on Lexapro, after which she had an increase in confusion and \"babbling.\" Three days ago, the patient was found on the floor of her memory care facility, presumably after a fall. She had previously used a walker but now is using a wheel chair. Also since that time, she has not wanted to eat and now has been staying up late at night. Because of these changes, they brought her to the ED for evaluation.  While in the ED, they report trembling and occasional complaint of leg pain but no other focal complaints.       Allergies:  Blood Transfusion Related      Medications:    Lopressor  Senokot  Seroquel  Zofran  Melatonin  Aspirin  Miralax  Voltaren  Thera-vit  Protonix  Vitamin D3  Tylenol  Mycostatin  Norvasc  Vitamin B12  Lexapro    Past Medical History:    Acute upper gastrointestinal hemorrhage  B12 nutritional deficiency  Benign hypertension with chronic kidney disease, stage III  CKD  Colon cancer  Femur fracture  Hyperlipidemia  Hypertension  Anemia  Chronic CAD  Small bowel obstruction  Alzheimer's dementia with behavioral disturbance   Intertrochanteric fracture of left hip  Acute gastric ulcer    Past Surgical History:    Colonoscopy  Open reduction internal fixation wrist, right  Cholecystectomy, laparoscopic  Hysterectomy   Laparoscopy suspension, bladder neck  Colectomy, left partial  Esophagoscopy, gastroscopy, duodenoscopy, combined x2  Open reduction internal fixation hip nailing  Laparoscopy herniorrhaphy ventral  Laparotomy, lysis adhesions, combined      Family History:  Mother: Stomach    Social History:  The patient has never smoked.   The patient does not drink alcohol.   The " "patient presents with her daughter and son-in-law.  Patient lives in a memory care facility.      Review of Systems   Unable to perform ROS: Dementia   Psychiatric/Behavioral: Positive for confusion.       Physical Exam   First Vitals:  BP: (!) 195/91  Heart Rate: 76  Resp: 18  Height: 157.5 cm (5' 2\")  Weight: 56.7 kg (125 lb)  SpO2: 94 %      Physical Exam   Gen: Pleasant, appears stated age.  Talking but not making sense.    Head:  No bruising or laceration.    Eye:   Pupils are equal, round, and reactive.     Sclera non-injected.    ENT:   Dry mucus membranes.     Normal tongue.    Oropharynx without lesions.    Cardiac:     Normal rate and regular rhythm.    No murmurs, gallops, or rubs.    Pulmonary:     Clear to auscultation bilaterally.    No wheezes, rales, or rhonchi.    Abdomen:     Normal active bowel sounds.     Abdomen is soft and non-distended, without focal tenderness.    Musculoskeletal:     Normal movement of all extremities without evidence for deficit.   No C/T or L spine tenderness.   Extremities are non-tender.    Extremities:    No edema.    Skin:   Warm and dry. Bruising over second right toe   An old bruise over the posterior chest wall, non-tender.    Neurologic:    Non-focal exam without asymmetric weakness or numbness.    Increased tone throughout.   Babbling, pleasantly confused.      Emergency Department Course     ECG @ 1532  Indication: Altered mental status  Rate 70 bpm.   IA interval 200 ms.   QRS duration 80 ms.   QT/QTc 396/427 ms.   P-R-T axes -46.  Notes: Sinus rhythm with premature atrial complexes. Left axis deviation.  Time read 1548    Imaging:  Retroperitoneal US per radiology:   Pending      Laboratory:  CBC: WBC 7.6 (WNL) HGB 9.6 (:)  (WNL) RBC 3.55 (L) HCT 29.7 (L) RDW 15.3 (H)  BMP: Cr 2.26 (H) Glucose 113 (H) Chloride 111 (H) BUN 36 (H) GFR 20 (L) Chloride 111 (H) Rest WNL  UA: Clear, yellow urine; GLC 30 (A) Blood Trace (A) Albumin 300 (A) WBC 3 (H) Rest " WNL  Urine Culture: Pending    Interventions:  1613: Normal Saline, 500 mL, IV     ED Course:  The patient arrived in triage where her vitals were measured and recorded. The patient was then escorted back to the emergency department.  Nursing notes and past medical history reviewed.   I performed a physical examination of the patient as documented above.  I explained the plan with the patient's daughter who consents to this.   Blood was drawn and sent to the laboratory for testing, see above results.   Patient is very volume depleted; after several attempts a peripheral IV was established.   Urine was sent to the laboratory for testing, see above results.   1534: Discussed the patient with Dr. Cuellar from the hospitalist service.  He is requesting renal ultrasound to evaluate for obstruction.  I personally reviewed the laboratory and imaging results with the Patient's family and answered all related questions prior to admitting.  Findings and plan explained to the daughter who consents to admission. Discussed the patient with Dr. Colindres, who will admit the patient to an in-patient bed for further monitoring, evaluation, and treatment.    Impression & Plan      Medical Decision Making:  This is a 89 year old female with a history of dementia, hip fracture, and hypertension who presents today with altered mental status. The patient has a specific POLST indicating that she does not aggressive measures or extensive workup. For this reason, fairly limited medical workup was undertaken to evaluate her altered mental status. It appears that she is in acute renal failure. The differential diagnosis for this continues to be broad. She does appear to be volume depleted on exam. We had some difficulty obtaining IV access but ultimately initially started IV rehydration. She was also noted to be hypertensive with proteinuria. This could be hypertensive related kidney injury versus nephrotic syndrome, although she does not  have any edema. She does not appear to be on any medications that would cause acute kidney injuries. At this point, I think she needs to be admitted to the hospital for additional workup and resuscitation. The family is in agreement with this plan.     Diagnosis:    ICD-10-CM    1. Acute renal failure, unspecified acute renal failure type (H) N17.9    2. Volume depletion E86.9    3. Altered mental status, unspecified altered mental status type R41.82          Disposition:   Admit to an in-[facility] bed under the care of Dr. Colindres.         ACE, [place] Tsang, am serving as a scribe on 2/25/2017 at 12:58 PM to personally document services performed by Zoe Oswald MD, based on my observations and the provider's statements to me.         Zoe Oswald MD  02/25/17 1728

## 2017-02-25 NOTE — H&P
[facility]    History and Physical  Hospitalist       Date of Admission:  2/25/2017  Date of Service (when I saw the patient): 02/25/17    Assessment & Plan   Mallorie Erickson is a 89 year old female who presents with history of dementia who was brought to the emergency room for change in mental status for about one week.  She has been started on Lexapro as adjuvant therapy for her dementia by her primary care physician about two weeks ago.  She has been experiencing visual hallucination and had episode of fall couple of days ago in the memory care and poor nutrition.  1-change in mental status: This is most likely due to combination of factors including initiation of Lexapro and also poor p.o. intake leading to volume depletion and acute renal failure.  The family does not want any imaging studies of her head.  I spoke to emergency room physician and medical head and order bilateral renal ultrasound to rule out obstruction.  Ram catheter in place and she will start on normal saline at 100 cc per hour. I would like to check Urine sodium as well.  2-advanced dementia: The family states that she usually does not have visual hallucination and this is new since she started on Lexapro.  The family does not want any aggressive treatments.  3-code status: She is DNR/DNI.  I discussed that with her daughter and son-in-law.  They had had the advanced directive in their possession.  4-hypertension: She has had poor p.o. intake and most likely has not been taking her metoprolol.  I will try to p.r.n. hydralazine and continue to monitor closely.  We need to check the EKG as well    Summary:  89-year-old female with history of dementia residing in memory care presented with change in mental status, visual hallucination, nonerythematous episode of fall a few days ago without acute injury per Family and poor p.o. Intake.  She was recently started on Lexapro.    DVT Prophylaxis: Low Risk/Ambulatory with no VTE  prophylaxis indicated  Code Status: DNR / DNI    Disposition: Expected discharge in 2-3 days once volume depletion improves and there is no evidence for change in mental status.    Sravan Colindres MD    Primary Care Physician   Everardo Tate    Chief Complaint   Change in mental status    History is obtained from the patient's daughter and son-in-law    History of Present Illness   Mallorie Erickson is a 89 year old female who presents with history of dementia who resides in memory care presented to emergency room with change in mental status.  The family states that she has been having visual hallucination and poor p.o. intake for the last week.  Apparently she fell couple of days ago but nobody witnessed that.  She has been started on Lexapro for the last two weeks and family believe that this is what made her do not eat and have change in mental status.  The patient was not able to keep many history.  Patient was awake and alert but mumbling.    Past Medical History    I have reviewed this patient's medical history and updated it with pertinent information if needed.   Past Medical History   Diagnosis Date     Acute upper gastrointestinal hemorrhage 12/15/2015     B12 nutritional deficiency      Benign hypertension with chronic kidney disease, stage III      CKD (chronic kidney disease) stage 3, GFR 30-59 ml/min      History of colon cancer 1999     was told she needs b12 due to this fore ever     History of femur fracture 11/2013     Hyperlipidaemia LDL goal < 100      Hypertension goal BP (blood pressure) < 140/80        Past Surgical History   I have reviewed this patient's surgical history and updated it with pertinent information if needed.  Past Surgical History   Procedure Laterality Date     Colonoscopy  2007     Open reduction internal fixation wrist  2001     RIGHT     Cholecystectomy, laporoscopic  1999     Cholecystectomy, Laparoscopic     Hysterectomy, pap still indicated  1970     Laparoscopic  suspension bladder neck  1970     Colectomy  1999     LEFT PARTIAL      Esophagoscopy, gastroscopy, duodenoscopy (egd), combined N/A 12/9/2015     Procedure: COMBINED ESOPHAGOSCOPY, GASTROSCOPY, DUODENOSCOPY (EGD), BIOPSY SINGLE OR MULTIPLE;  Surgeon: Deepika Ren MD;  Location:  GI     Esophagoscopy, gastroscopy, duodenoscopy (egd), combined N/A 12/16/2015     Procedure: COMBINED ESOPHAGOSCOPY, GASTROSCOPY, DUODENOSCOPY (EGD);  Surgeon: Jens Robertson MD;  Location:  GI     Open reduction internal fixation hip nailing Left 4/21/2016     Procedure: OPEN REDUCTION INTERNAL FIXATION HIP NAILING;  Surgeon: Jeffery Contreras MD;  Location:  OR     Laparoscopic herniorrhaphy ventral N/A 6/29/2016     Procedure: LAPAROSCOPIC HERNIORRHAPHY VENTRAL;  Surgeon: Alejandro Franks MD;  Location:  OR     Laparotomy, lysis adhesions, combined N/A 6/29/2016     Procedure: COMBINED LAPAROTOMY, LYSIS ADHESIONS;  Surgeon: Alejandro Franks MD;  Location:  OR       Prior to Admission Medications   Prior to Admission Medications   Prescriptions Last Dose Informant Patient Reported? Taking?   ACETAMINOPHEN PO prn med  Yes Yes   Sig: Take 650 mg by mouth every 6 hours as needed for pain   Cholecalciferol (VITAMIN D3 PO)  at 0800 senior care Yes Yes   Sig: Take 2,000 Units by mouth daily   Cyanocobalamin (VITAMIN B-12 IJ) 2/6/2017 senior care Yes Yes   Sig: Inject 1,000 mcg as directed every 30 days    MELATONIN PO  at 2000 Nursing Home Yes Yes   Sig: Take 3 mg by mouth At Bedtime    Pantoprazole Sodium (PROTONIX PO)  at 0730 senior care Yes Yes   Sig: Take 40 mg by mouth every morning (before breakfast)   QUEtiapine Fumarate (SEROQUEL PO)  at 1600 senior care Yes Yes   Sig: Take 25 mg by mouth daily At 4PM   QUEtiapine Fumarate (SEROQUEL PO) prn med Nursing Home Yes Yes   Sig: Take 12.5 mg by mouth every 4 hours as needed   QUEtiapine Fumarate (SEROQUEL PO)  at 0800  Yes Yes   Sig: Take 12.5 mg by mouth every  morning   diclofenac (VOLTAREN) 1 % GEL  Nursing Home Yes Yes   Sig: Place 2 g onto the skin 2 times daily To elbow   metoprolol (LOPRESSOR) 50 MG tablet   No Yes   Sig: Take 1 tablet (50 mg) by mouth 2 times daily   nystatin (MYCOSTATIN) 570052 UNIT/GM POWD  Nursing Home No Yes   Sig: Apply bid to groin and under pannus   polyethylene glycol (MIRALAX/GLYCOLAX) powder  at 0800  Yes Yes   Sig: Take 17 g by mouth daily   senna-docusate (SENOKOT-S;PERICOLACE) 8.6-50 MG per tablet  at 0800 long term Yes Yes   Sig: Take 1 tablet by mouth daily Hold if loose stools    senna-docusate (SENOKOT-S;PERICOLACE) 8.6-50 MG per tablet prn med long term Yes Yes   Sig: Take 1 tablet by mouth daily as needed for constipation      Facility-Administered Medications: None     Allergies   Allergies   Allergen Reactions     Blood Transfusion Related (Informational Only) Other (See Comments)     Patient has a history of a clinically significant antibody against RBC antigens.  A delay in compatible RBCs may occur.       Social History   I have reviewed this patient's social history and updated it with pertinent information if needed. Mallorie Erickson  reports that she has never smoked. She has never used smokeless tobacco. She reports that she does not drink alcohol.    Family History   I have reviewed this patient's family history and updated it with pertinent information if needed.   Family History   Problem Relation Age of Onset     CANCER Mother 42     STOMACH       Review of Systems   The 10 point Review of Systems is negative other than noted in the HPI     Physical Exam   Temp: 98.3  F (36.8  C) Temp src: Oral BP: (!) 195/91   Heart Rate: 76 Resp: 18 SpO2: 94 % O2 Device: None (Room air)    Vital Signs with Ranges  Temp:  [98.3  F (36.8  C)] 98.3  F (36.8  C)  Heart Rate:  [76] 76  Resp:  [18] 18  BP: (195)/(91) 195/91  SpO2:  [94 %] 94 %  125 lbs 0 oz    Constitutional: awake, alert, pale and she looks dry and is mumbling.  Is not  able to give any history.  She seemed to be confused  Eyes: lids and lashes normal and pupils equal, round and reactive to light  ENT: Mucosa is extremely dry.  Hematologic / Lymphatic: no cervical lymphadenopathy and no supraclavicular lymphadenopathy  Respiratory: No increased work of breathing, good air exchange, clear to auscultation bilaterally, no crackles or wheezing  Cardiovascular: normal apical pulses , normal S1 and S2 and the heart sound is hyperdynamic and there is 2/6 systolic ejection murmur at left sternal border  GI: normal bowel sounds and non-distended  Skin: no bruising or bleeding and the skin is very dry  Musculoskeletal: There is no redness, warmth, or swelling of the joints.  Full range of motion noted.  Motor strength is 5 out of 5 all extremities bilaterally.  Tone is normal.  Neurologic: She is alert but confused.  She is not oriented to time or place.  She moves all extremities.  Review of system is limited due to her profound dementia and confusion  Neuropsychiatric: General: calm and poor eye contact  Level of consciousness: She is alert but confused and not oriented to time or place  Affect: Not able to assess  Orientation: She is confused and not oriented to time or place    Data       Recent Labs  Lab 02/25/17  1454   WBC 7.6   HGB 9.6*   MCV 84         POTASSIUM 4.8   CHLORIDE 111*   CO2 23   BUN 36*   CR 2.26*   ANIONGAP 8   KENTON 8.6   *       No results found for this or any previous visit (from the past 24 hour(s)).

## 2017-02-25 NOTE — IP AVS SNAPSHOT
"` [place] [address] ONCOLOGY: [phone]                                              INTERAGENCY TRANSFER FORM - NURSING   2017                    Hospital Admission Date: 2017  SYLVESTER TEE   : 3/25/1923  Sex: Female        Attending Provider: Sravan Cloindres MD     Allergies:  Blood Transfusion Related (Informational Only)    Infection:  None   Service:  HOSPITALIST    Ht:  1.575 m (5' 2\")   Wt:  55.9 kg (123 lb 3.8 oz)   Admission Wt:  56.7 kg (125 lb)    BMI:  22.54 kg/m 2   BSA:  1.56 m 2            Patient PCP Information     Provider PCP Type    Everardo Tate MD General      Current Code Status     Date Active Code Status Order ID Comments User Context       Prior      Code Status History     Date Active Date Inactive Code Status Order ID Comments User Context    3/6/2017 10:29 AM  DNR/DNI [id number]  Sugar Whitaker DO Outpatient    2017  4:40 PM 3/6/2017 10:29 AM DNR/DNI [id number]  Sravan Colindres MD ED    7/3/2016 10:14 AM 2017  4:40 PM DNR/DNI [id number]  Noble Polo MD Outpatient    2016 11:31 PM 7/3/2016 10:14 AM DNR/DNI [id number]  Jeri Allan MD Inpatient    2016 10:02 AM 2016 11:31 PM DNR/DNI [id number]  Keegan Berumen MD Outpatient    2016  4:29 PM 2016 10:02 AM Full Code [id number]  Jeffery Contreras MD Outpatient    2016  1:11 PM 2016  4:29 PM DNR/DNI [id number]  Ronald Sims PA Inpatient    2015  7:02 AM 2016  1:11 PM DNR/DNI [id number]  Keith Schneider MD Outpatient    12/15/2015  9:56 PM 2015  7:02 AM DNR/DNI [id number]  Rafael Dawson MD Inpatient    12/10/2015  9:49 AM 12/15/2015  9:56 PM DNR/DNI [id number]  Dago Sanders MD Outpatient    2015 11:00 PM 12/10/2015  9:49 AM DNR/DNI [id number]  Juan Carlos Santana DO Inpatient    2013  7:26 PM 2015 11:00 PM DNR/DNI [id number]  Dimitrios Quinones MD Outpatient      Advance Directives  "       Does patient have a scanned Advance Directive/ACP document in EPIC?           Yes        Hospital Problems as of 3/6/2017              Priority Class Noted POA    Change in mental status Medium  2/25/2017 Yes      Non-Hospital Problems as of 3/6/2017              Priority Class Noted    Injury of kidney Medium  11/24/2012    Altered mental status Medium  11/24/2012    Essential hypertension Medium  11/24/2012    Anemia Medium  11/27/2012    Acute renal failure (H) Medium  11/27/2012    Bradycardia Medium  11/27/2012    History of colon cancer   Unknown    Hyperlipidemia with target LDL less than 100   Unknown    Benign hypertension with chronic kidney disease, stage III   Unknown    CKD (chronic kidney disease) stage 3, GFR 30-59 ml/min   Unknown    Anemia in chronic kidney disease (CKD)   2/6/2013    Acute posthemorrhagic anemia Medium  12/11/2015    Acute gastric ulcer Medium  1/22/2016    Intertrochanteric fracture of left hip (H) Medium  4/21/2016    Encounter for therapeutic drug monitoring Medium  5/4/2016    Alzheimer's dementia with behavioral disturbance Medium  5/25/2016    Small bowel obstruction (H) Medium  6/21/2016    ACP (advance care planning)   7/27/2016    Chronic coronary artery disease Medium  2/25/2017    Candidiasis of skin Medium  2/25/2017      Immunizations     Name Date      Influenza (IIV3) 10/01/13     Influenza (IIV3) 10/01/12     Pneumococcal 23 valent 01/01/06     TD (ADULT, 7+) 01/01/07          END      ASSESSMENT     Discharge Profile Flowsheet     EXPECTED DISCHARGE     Patient's communication style  spoken language (English or Bilingual) 02/25/17 1235    Expected Discharge Date  03/06/17 03/06/17 1135   FINAL RESOURCES      DISCHARGE NEEDS ASSESSMENT     Resources List  Home Care 03/06/17 1135    Concerns To Be Addressed  discharge planning concerns 03/06/17 1135   Other Resources  Transportation Services 03/06/17 1135    Patient/family verbalizes understanding of discharge  "plan recommendations?  Yes 03/06/17 1135   Transportation Agency  [facility] 03/06/17 1135    Medical Team notified of plan?  yes 03/06/17 1135   Transportation Contact Info  [phone] 03/06/17 1135    Anticipated Changes Related to Illness  inability to care for self 03/06/17 1135   Transportation Status  Active 03/06/17 1135    Equipment Currently Used at Home  walker, rolling 02/28/17 1307   Home Care  [facility] [phone], Fax: [phone] 03/06/17 1135    Transportation Available  ambulance 03/06/17 1135   SKIN      Current Discharge Risk  physical impairment 03/06/17 1135   Inspection  Full 03/06/17 0929    # of Referrals Placed by [facility]  Homecare;Transportation 03/06/17 1135   Skin WDL  ex 03/06/17 0929    GASTROINTESTINAL (ADULT,PEDIATRIC,OB)     Skin Color/Characteristics  bruised (ecchymotic) 03/06/17 0929    GI WDL  ex 03/06/17 0929   Skin Temperature  warm 03/06/17 0929    Last Bowel Movement  03/03/17 03/03/17 0115   Skin Integrity  bruise(s) 03/06/17 0929    GI Signs/Symptoms  fecal incontinence 03/06/17 0929   SAFETY      Passing flatus  yes 03/02/17 1301   Safety WDL  WDL 03/06/17 0929    COMMUNICATION ASSESSMENT                        Assessment WDL (Within Defined Limits) Definitions           Safety WDL     Effective: 09/28/15    Row Information: <b>WDL Definition:</b> Bed in low position, wheels locked; call light in reach; upper side rails up x 2; ID band on<br> <font color=\"gray\"><i>Item=AS safety wdl>>List=AS safety wdl>>Version=F14</i></font>      Skin WDL     Effective: 09/28/15    Row Information: <b>WDL Definition:</b> Warm; dry; intact; elastic; without discoloration; pressure points without redness<br> <font color=\"gray\"><i>Item=AS skin wdl>>List=AS skin wdl>>Version=F14</i></font>      Vitals     Vital Signs Flowsheet     QUICK ADDS     Pain Intervention(s)  Repositioned (too early for pain meds) 03/03/17 2103    Quick Adds  Comments 02/26/17 8573   Response to " "Interventions  Absence of nonverbal indicators of pain 02/27/17 1051    VITAL SIGNS     ANALGESIA SIDE EFFECTS MONITORING      Temp  99  F (37.2  C) 03/06/17 0814   Side Effects Monitoring: Respiratory Quality  R 03/04/17 2000    Temp src  Oral 03/06/17 0814   Side Effects Monitoring: Respiratory Depth  N 03/04/17 2000    Resp  18 03/06/17 0814   Side Effects Monitoring: Sedation Level  1 03/04/17 2000    Pulse  94 03/06/17 0402   HEIGHT AND WEIGHT      Heart Rate  79 03/06/17 0814   Height  1.575 m (5' 2\") 02/25/17 1247    Pulse/Heart Rate Source  Monitor 03/06/17 0814   Height Method  Estimated 02/25/17 1247    BP  152/65 03/06/17 0814   Weight  55.9 kg (123 lb 3.8 oz) 03/06/17 0504    BP Location  Right arm 03/06/17 0814   BSA (Calculated - sq m)  1.57 02/25/17 1247    OXYGEN THERAPY     BMI (Calculated)  22.91 02/25/17 1247    SpO2  96 % 03/06/17 0814   ROSITA COMA SCALE      O2 Device  None (Room air) 03/06/17 0814   Best Eye Response  3-->(E3) to speech 03/05/17 1330    PAIN/COMFORT     Best Motor Response  -- (VANDANA due to lethargy) 03/05/17 1330    Patient Currently in Pain  denies 03/06/17 0402   Best Verbal Response  4-->(V4) confused 03/05/17 1330    Preferred Pain Scale  word (verbal rating pain scale) 03/03/17 2106   [place] Coma Scale Score  14 03/04/17 2000    Word Pain Scale  2 03/03/17 2106   POSITIONING      FACES Pain Rating  0-->no hurt 03/05/17 1633   Body Position  left, side-lying 03/06/17 0534    rFLACC Pain Rating: Face  0-->no particular expression or smile 03/05/17 1633   Head of Bed (HOB)  HOB at 15 degrees 03/06/17 0534    rFLACC Pain Rating - Legs  0-->normal position or relaxed 03/05/17 1633   Positioning/Transfer Devices  pillows;in use 03/06/17 0916    rFLACC Pain Rating: Activity  0-->lying quietly, normal position, moves easily 03/05/17 1633   Chair  Upright in chair 03/06/17 0916    rFLACC Pain Rating - Cry  0-->no cry (awake or asleep) 03/05/17 1633   DAILY CARE      rFLACC Pain " Rating - Consolability  0-->content, relaxed 03/05/17 1633   Activity Type  activity adjusted per tolerance 03/06/17 0916    rFLACC Score: Activity  0 03/05/17 1633   Activity Level of Assistance  assistance, 2 people 03/06/17 0916    Pain Location  Leg 03/03/17 2106   Activity Assistive Device  walker;gait belt 03/06/17 0916    Pain Orientation  Left 03/03/17 2106   Additional Documentation  Activity Device Assistance (Row) 03/03/17 0115    Pain Descriptors  Aching;Discomfort 03/03/17 2106                 Patient Lines/Drains/Airways Status    Active LINES/DRAINS/AIRWAYS     Name: Placement date: Placement time: Site: Days: Last dressing change:    Wound 02/25/17 Right Toe (Comment  which one) 2nd toe dark bruise 02/25/17   1700   Toe (Comment  which one)   8             Patient Lines/Drains/Airways Status    Active PICC/CVC     None            Intake/Output Detail Report     Date Intake     Output Net    Shift P.O. I.V. IV Piggyback Total Urine Total       Noc 03/04/17 2300 - 03/05/17 0659 60 -- -- 60 525 525 -465    Day 03/05/17 0700 - 03/05/17 1459 0 -- -- -- 400 400 -400    Roxie 03/05/17 1500 - 03/05/17 2259 -- -- -- -- 700 700 -700    Noc 03/05/17 2300 - 03/06/17 0659 240 -- -- 240 550 550 -310    Day 03/06/17 0700 - 03/06/17 1459 -- -- -- -- -- -- 0      Last Void/BM       Most Recent Value    Urine Occurrence 0 at 03/04/2017 1810    Stool Occurrence 1 at 03/04/2017 1332      Case Management/Discharge Planning     Case Management/Discharge Planning Flowsheet     REFERRAL INFORMATION     Understanding Of Condition And Treatment  distorted comprehension 03/06/17 1135    Admission Type  inpatient 03/06/17 1133   EXPECTED DISCHARGE      Arrived From  nursing facility 03/06/17 1133   Expected Discharge Date  03/06/17 03/06/17 1135    # of Referrals Placed by [facility]  Homecare;Transportation 03/06/17 1135   ASSESSMENT/CONCERNS TO BE ADDRESSED      Reason For Consult  discharge planning 03/06/17 1133   Concerns To Be  Addressed  discharge planning concerns 03/06/17 1135    Record Reviewed  patient profile 03/06/17 1133   DISCHARGE PLANNING       Assigned to Case  LYLA Prasad 03/06/17 1133   Patient/family verbalizes understanding of discharge plan recommendations?  Yes 03/06/17 1135    LIVING ENVIRONMENT     Medical Team notified of plan?  yes 03/06/17 1135    Lives With  facility resident 03/06/17 1133   Anticipated Changes Related to Illness  inability to care for self 03/06/17 1135    Living Arrangements  assisted living 03/06/17 1133   Transportation Available  ambulance 03/06/17 1135    Provides Primary Care For  no one 03/06/17 1133   Current Discharge Risk  physical impairment 03/06/17 1135    Primary Care Provided By  other (see comments) ([facility] staff) 03/06/17 1133   FINAL NOTE      Quality Of Family Relationships  supportive;involved 03/06/17 1133   Final Note  Patietn is to d/c to [facility] via stretcher w/ [facility] @ 1530 on 3/6 03/06/17 1135    Able to Return to Prior Living Arrangements  yes 03/06/17 1133   FINAL RESOURCES      HOME SAFETY     Equipment Currently Used at Home  walker, rolling 02/28/17 1307    Patient Feels Safe Living in Home?  yes 03/06/17 1133   Resources List  Home Care 03/06/17 1135    ASSESSMENT OF FAMILY/SOCIAL SUPPORT     Other Resources  Transportation Services 03/06/17 1135    Marital Status  Single 03/06/17 1135   Transportation Agency  [facility] 03/06/17 1135    Who is your support system?  Children 03/06/17 1135   Transportation Contact Info  [phone] 03/06/17 1135    Description of Support System  Supportive;Involved 03/06/17 1135   Transportation Status  Active 03/06/17 1135    Support Assessment  Adequate family and caregiver support 03/06/17 1135   Home Care  [facility] [phone], Fax: [phone] 03/06/17 1135    Quality of Family Relationships  supportive;involved 03/06/17 1135   ABUSE RISK SCREEN      Communication preferences  phone  03/06/17 1135   QUESTION TO PATIENT:  Has a member of your family or a partner(now or in the past) intimidated, hurt, manipulated, or controlled you in any way?  patient declined to answer or is unable to answer 02/25/17 1251    COPING/STRESS     QUESTION TO PATIENT: Do you feel safe going back to the place where you are living?  patient declined to answer or is unable to answer 02/25/17 1251    Major Change/Loss/Stressor  hospitalization 03/06/17 1135   OBSERVATION: Is there reason to believe there has been maltreatment of a vulnerable adult (ie. Physical/Sexual/Emotional abuse, self neglect, lack of adequate food, shelter, medical care, or financial exploitation)?  no 02/25/17 1251    Patient Personal Strengths  strong support system;able to adapt 03/06/17 1135   (R) MENTAL HEALTH SUICIDE RISK      Sources Of Support  adult child(khris) 03/06/17 1135   Are you depressed or being treated for depression?  No 02/25/17 1946    Reaction To Health Status  unable to assess 03/06/17 1134

## 2017-02-26 NOTE — PROVIDER NOTIFICATION
Hospitalist paged regarding: Positive CAM, combativeness, hypertension, refusal of oral meds. New orders received for IV lorazepam 0.5 mg x1, discontinue PO metoprolol and start IV metoprolol 5 mg Q 6 hrs.

## 2017-02-26 NOTE — PROGRESS NOTES
Pt arrived from ED at 1640. Disoriented x4 but cooperative upon arrival. Illogical verbalizations. Pt is now agitated, combative, yelling at staff not to touch her and leave her alone. /93, given 10 mg IV hydralazine but refuses BP recheck at this time.  cc/hr. Ram catheter placed. Clear liquid diet. CMS intact. Strict bedrest with Ax2 to reposition.

## 2017-02-26 NOTE — PROVIDER NOTIFICATION
"Dr Colindres notified regarding pt's continued agitation despite IV ativan. Pt continuously yelling out and swearing with no staff present in room. Writer attempted to take pt's BP and pt refused, throwing punches and yelling \"get the hell out of here, don't you dare touch me.\" Order for haldol.   "

## 2017-02-26 NOTE — PROGRESS NOTES
[facility]  Hospitalist Progress Note for 2/26/2017          Assessment and Plan:   Ms Mallorie Erickson is a 89 year old female who presents with history of dementia who was brought to the emergency room for change in mental status for about one week. She has been started on Lexapro as adjuvant therapy for her dementia by her primary care physician about two weeks ago. She has been experiencing visual hallucination and had episode of fall couple of days ago in the memory care and poor nutrition.      JAY:  - due to decreased  poor p.o. intake leading to volume depletion   - hx CKD stage 3, admit cr 2.26  - bilateral renal ultrasound -ve for obstruction.  - Ram catheter in place, I/Os 1338/650  - continue NS at 100 cc per hour.  - labs pending,Urine sodium 77     Altered  mental status/aggitation:   - multifactorial- likely due to new Lexapro decreased  poor p.o. intake leading to volume depletion and acute renal failure.   - family does not want any imaging studies of her head.  - continue prn Haldol, has po seroquelbut has not taken po since admit     Advanced dementia:  - visual hallucination and this is new since she started on Lexapro.  -  family does not want any aggressive treatments.    hypertension: She has had poor p.o. intake and most likely has not been taking her metoprolol. I will try to p.r.n. hydralazine and continue to monitor closely. We need to check the EKG as well     Asia Perez MD  Hospitalist               Interval History:   Pt sleeping. Per RN had a rough night- agitation /combative requiring haldol              Medications:       pantoprazole (PROTONIX) EC tablet 40 mg  40 mg Oral QAM AC     polyethylene glycol  17 g Oral Daily     QUEtiapine (SEROquel) tablet 25 mg  25 mg Oral Daily     QUEtiapine (SEROquel) half-tab 12.5 mg  12.5 mg Oral QAM     senna-docusate  1 tablet Oral Daily     metoprolol  5 mg Intravenous Q6H     acetaminophen (TYLENOL) tablet 650 mg,  "miconazole, senna-docusate, potassium chloride, potassium chloride, potassium chloride, potassium chloride with lidocaine, potassium chloride, hydrALAZINE, haloperidol lactate               Physical Exam:   Blood pressure 178/69, temperature 98.7  F (37.1  C), temperature source Axillary, resp. rate 19, height 1.575 m (5' 2\"), weight 58.6 kg (129 lb 3 oz), SpO2 92 %.  Wt Readings from Last 4 Encounters:   17 58.6 kg (129 lb 3 oz)   16 59.1 kg (130 lb 6.4 oz)   16 58.2 kg (128 lb 3.2 oz)   16 60.3 kg (133 lb)         Vital Sign Ranges  Temperature Temp  Av  F (37.2  C)  Min: 98.3  F (36.8  C)  Max: 100.1  F (37.8  C)   Blood pressure Systolic (24hrs), Av , Min:174 , Max:228        Diastolic (24hrs), Av, Min:64, Max:97      Pulse No Data Recorded   Respirations Resp  Av  Min: 18  Max: 20   Pulse oximetry SpO2  Av %  Min: 89 %  Max: 94 %         Intake/Output Summary (Last 24 hours) at 17 0814  Last data filed at 17 0622   Gross per 24 hour   Intake             1338 ml   Output              650 ml   Net              688 ml       Constitutional: Sleeping, seems comfortable, no apparent resp distress   Lungs: Clear to auscultation bilaterally anterioly, no crackles or wheezing   Cardiovascular: Regular rate and rhythm, normal S1 and S2, and no murmur noted   Abdomen: Normal bowel sounds, soft, non-distended, non-tender   Skin: No rashes, no cyanosis, no edema   Neuro:                Data:   All laboratory data reviewed    "

## 2017-02-26 NOTE — PLAN OF CARE
Problem: Goal Outcome Summary  Goal: Goal Outcome Summary  Outcome: No Change  Disoriented x 4, confused. Speech garbled, illogical. BP elevated, given scheduled Lopressor. TMAX 100.1, other VSS. VANDANA pain, does not appear to be in pain. Restless/agitated at times, given Haldol. Appears internally preoccupied, talking to self/picking at things in air. Ram patent, UOP adequate. CMS intact. Strict bedrest. Clear liquid diet. PIV infusing. Pt appeared to rest comfortably throughout the night. Will continue to monitor.

## 2017-02-26 NOTE — PLAN OF CARE
Problem: Goal Outcome Summary  Goal: Goal Outcome Summary  Outcome: No Change  [id number]: Pt agitated, restless, combative, refusing cares. Yelling out, speech illogical. Hallucinating and asking people to get out of the room (no one is there). Occasionally pulling at IV & koehler, fidgeting with sheets. IV haldol given x1 with some relief. Still yelling out at times but more calm overall. Was able to recheck BP, 174/74. PRN hydralazine available if SBP >180. Koehler in place with adequate output. Pt does not appear to be in any pain. Warm blanket given and lights dimmed to help promote rest.

## 2017-02-27 NOTE — PROVIDER NOTIFICATION
MD Notification    Notified Person:  MD    Notified Persons Name: Dr. Perez     Notification Date/Time: 8:10 AM 2/27/17    Notification Interaction:  Talked with Physician    Purpose of Notification: /71, HR 57. PRN Hydralazine administered     Orders Received: Decrease IVF to 75cc/hr, will order Nitro patch. Continue scheduled Metoprolol and PRN Hydralazine    Comments: Will continue to monitor closely

## 2017-02-27 NOTE — PLAN OF CARE
Problem: Goal Outcome Summary  Goal: Goal Outcome Summary  Outcome: Improving  Pt lethargic this AM, more alert this afternoon. Oriented to self only, calm/cooperative. Hypertensive (see MD notification) otherwise VSS on RA. Nitroglycerin PIP on R deltoid. Denies pain/nausea/SOB. IVF decreased to 50 cc/hr. Diet advanced to full liquid, poor appetite. Nutrition services consulted. Ram catheter removed, DTV. No BMs this shift. T/R q2hrs, heels elevated at all times. Family present for part of shift. LAINEY Porras at pt's memory care facility, updated on progress. PT consult pending. Will continue to monitor.

## 2017-02-27 NOTE — PLAN OF CARE
Problem: Goal Outcome Summary  Goal: Goal Outcome Summary  Outcome: No Change  Disoriented x4. Hypertensive, IV hydralazine given x2. Other VSS. Lethargic most of the day, alert and yelling out intermittently. Combative with cares. Pt pulled at koehler catheter, mitts applied.  cc/hr. Refused AM meds. Clear liquid diet. Incontinent of stool. Bedrest. R heel reddened, heels elevated in bed.

## 2017-02-27 NOTE — PLAN OF CARE
Problem: Goal Outcome Summary  Goal: Goal Outcome Summary  Outcome: No Change  Pt Alert; disoriented. Screams out for help and is impulsive at times. BP elevated; otherwise VSS.  LS diminished. BS active. Incontinent of stool. Ram in place with good output. Slept most of night; appears comfortable.

## 2017-02-28 NOTE — PLAN OF CARE
Problem: Goal Outcome Summary  Goal: Goal Outcome Summary  PT evaluation completed and treatment initiated. Pt is a 89 year old female admitted for change in Mental status and fall one week ago. The pt is oriented to self and lives in a memory care unit where according to the pt's daughter, she uses a walker independently except she has help with toileting and bathing and dressing. The pt has not had other falls. Currently, the pt is cooperative and pleasant. The pt has decreased active ROM left LE due to pain. The pt has pain in left LE with weight bearing activity. The pt requires Jazmin using walker sit to stand and is able to weight bear, but complains of pain when up and reports relief with sitting. The pt was only able to ambulate 10ft with a  walker and modA due to left LE pain. The pt reports pain is in her left hip to her toe. Request was made for x ray of hip. The pt will need increased assist with mobility and if facility can accommodate, she can return to memory care.

## 2017-02-28 NOTE — PLAN OF CARE
Problem: Goal Outcome Summary  Goal: Goal Outcome Summary  Outcome: No Change  Pt disorientated x4, confused.  Pt unable to void, bladder scanned for 300ml, strait cathed for 225ml.  IVF infusing.  R 2nd toe is bruised.  Plan for PT to assess pt.

## 2017-02-28 NOTE — PROGRESS NOTES
02/28/17 1051   Quick Adds   Type of Visit Initial PT Evaluation   Living Environment   Lives With facility resident   Home Accessibility no concerns   Self-Care   Usual Activity Tolerance moderate   Current Activity Tolerance fair   Regular Exercise no   Equipment Currently Used at Home walker, rolling   Activity/Exercise/Self-Care Comment Per pt's daughter, the pt uses a walker independently some , but staff assist to bathroom and for ADLs.   Functional Level Prior   Ambulation 3-->assistive equipment and person   Transferring 1-->assistive equipment   Toileting 3-->assistive equipment and person   Bathing 2-->assistive person   Dressing 2-->assistive person   Eating 2-->assistive person   Communication 3-->unable to understand (not related to language barrier)   Swallowing 0-->swallows foods/liquids without difficulty   Cognition 1 - attention or memory deficits   Fall history within last six months yes   Number of times patient has fallen within last six months 1   Which of the above functional risks had a recent onset or change? fall history   General Information   Onset of Illness/Injury or Date of Surgery - Date 02/25/17   Referring Physician Dr. Rahman   Patient/Family Goals Statement Pt did not state   Pertinent History of Current Problem (include personal factors and/or comorbidities that impact the POC) PT is a 89 year old female admitted for change in mental status and fall. Pt has dementia and lives on [facility].   Precautions/Limitations fall precautions   Weight-Bearing Status - LLE (no restrictions but painful to weight bear)   Weight-Bearing Status - RLE full weight-bearing   General Observations Pt sitting up in chair eating ice cream   Cognitive Status Examination   Orientation person   Level of Consciousness alert   Follows Commands and Answers Questions able to follow single-step instructions   Personal Safety and Judgment impaired   Memory impaired   Pain Assessment  "  Patient Currently in Pain Yes, see Vital Sign flowsheet   Range of Motion (ROM)   ROM Comment UE: WFL, Functionla but decreased active and passive ROM left LE due to pain, Right LE WFL   Strength   Strength Comments Decreased strength in LE , grossly 3+/5 right , 3-/5 left   Bed Mobility   Bed Mobility Comments Jazmin   Transfer Skills   Transfer Comments Jazmin and cues   Gait   Gait Comments See flowsheet. Jazmin of 2   Balance   Balance Comments Requires AD for static and dynamic mobiltiy   Sensory Examination   Sensory Perception no deficits were identified   Muscle Tone   Muscle Tone no deficits were identified   General Therapy Interventions   Planned Therapy Interventions balance training;gait training;strengthening;transfer training   Clinical Impression   Criteria for Skilled Therapeutic Intervention yes, treatment indicated   PT Diagnosis Difficulty with gait   Influenced by the following impairments Pain in left LE, decreased cognition, gross weakness   Functional limitations due to impairments incrased assist with mobility compard to baseline.   Clinical Presentation Evolving/Changing   Clinical Presentation Rationale Pt has hip pain that has not been assessed. Pt requires assist with mobility and has cognitive deficits   Clinical Decision Making (Complexity) Moderate complexity   Therapy Frequency` daily   Predicted Duration of Therapy Intervention (days/wks) 3-5 days   Anticipated Discharge Disposition Long Term Care Facility   Risk & Benefits of therapy have been explained Yes   Patient, Family & other staff in agreement with plan of care Yes  (Spoke with daughter before eval to get history )   [facility] [employer] \"6 Clicks\"   2016, Trustees of [facility], under license to [employer].  All rights reserved.   6 Clicks Short Forms Basic Mobility Inpatient Short Form   [facility] AM-PAC  \"6 Clicks\" V.2 Basic Mobility Inpatient Short Form   1. Turning from your back to your side while " in a flat bed without using bedrails? 3 - A Little   2. Moving from lying on your back to sitting on the side of a flat bed without using bedrails? 3 - A Little   3. Moving to and from a bed to a chair (including a wheelchair)? 3 - A Little   4. Standing up from a chair using your arms (e.g., wheelchair, or bedside chair)? 3 - A Little   5. To walk in hospital room? 3 - A Little   6. Climbing 3-5 steps with a railing? 2 - A Lot   Basic Mobility Raw Score (Score out of 24.Lower scores equate to lower levels of function) 17   Total Evaluation Time   Total Evaluation Time (Minutes) 20

## 2017-02-28 NOTE — CONSULTS
"CLINICAL NUTRITION SERVICES  -  ASSESSMENT NOTE      RECOMMENDATIONS FOR MD/PROVIDER TO ORDER:   As medically feasible, advance diet as tolerated   Recommendations Ordered by Registered Dietitian (RD):  Ensure Plus with meals  Daily multivitamin    Future/Additional Recommendations:   Continue to encourage intake    Malnutrition: Patient does not meet two of the criteria necessary for diagnosing malnutrition      REASON FOR ASSESSMENT  Mallorie Erickson is a 89 year old female seen by Registered Dietitian for RN Consult - Limited PO intake this shift. Intake <25% of meals.      NUTRITION HISTORY  -Unable to obtain nutrition history due to patients mental status with dementia and lack of family in the room.     Per chart review, the patient follows a regular diet at home. Patient was started on Lexapro 2 weeks ago, which is what family suspects led to her not eat.      CURRENT NUTRITION ORDERS  Diet Order:     Full liquid    Room service with Assist    Current Intake/Tolerance:  Per nursing, the patient has mostly had bites of food since admission. However, today she had good intake had 100% of tray. Patient was open to trying supplements with her meals.      PHYSICAL FINDINGS  Observed  No nutrition-related physical findings observed  Obtained from Chart/Interdisciplinary Team  None noted    ANTHROPOMETRICS  Height: 157.5 cm (5' 2\")  Weight: 58.6kg (129 lbs 3.03 oz)  Body mass index is 23.63 kg/(m^2).  Weight Status:  Normal BMI  IBW: 50 kg  % IBW: 117%  Weight History: No weight loss noted, weight appears to trend between 126-130#  Wt Readings from Last 10 Encounters:   02/26/17 58.6 kg (129 lb 3 oz)   07/14/16 59.1 kg (130 lb 6.4 oz)   07/13/16 58.2 kg (128 lb 3.2 oz)   07/11/16 60.3 kg (133 lb)   07/04/16 58.6 kg (129 lb 1.6 oz)   06/28/16 56.4 kg (124 lb 5.4 oz)   06/21/16 57.4 kg (126 lb 8 oz)   06/20/16 57.4 kg (126 lb 8 oz)   06/16/16 57.2 kg (126 lb)   06/09/16 57.4 kg (126 lb 8 oz)       LABS  Sodium: 148 (H) on " 2/28    MEDICATIONS  IVF: 50 mL/hr    Dosing Weight 58.6 kg (admission weight)    ASSESSED NUTRITION NEEDS:  Estimated Energy Needs: 6890-1552 kcals (25-30 Kcal/Kg)  Justification: maintenance  Estimated Protein Needs: 57-70 grams protein (1-1.2 g pro/Kg)  Justification: maintenance  Estimated Fluid Needs: 6475-9259  mL (1 mL/Kcal)  Justification: maintenance or per provider pending fluid status    MALNUTRITION:  % Weight Loss:  None noted  % Intake:  <75% for 2 weeks (non-severe)  Subcutaneous Fat Loss:  None noted   Muscle Loss: None noted  Fluid Retention:  None noted    Malnutrition Diagnosis: Patient does not meet two of the above criteria necessary for diagnosing malnutrition    NUTRITION DIAGNOSIS:  Inadequate oral intake related to decreased appetite as evidenced by consume on average <25% of meals for 3 days      NUTRITION INTERVENTIONS  Recommendations / Nutrition Prescription  -Advance diet as tolerated, encouraging intake   -Oral supplements to support intake  -Daily multivitamin with mineral for potential micronutrient deficiencies with minimal PO intake    Implementation  -Nutrition education: Per Provider order if indicated   -Medical Food Supplement-Ordered Ensure plus with meals  -Multivitamin/Mineral-ordered daily multivitamin with minerals    Nutrition Goals  Patient to consume % of meals TID and supplements  .  MONITORING AND EVALUATION:  Progress towards goals will be monitored and evaluated per protocol and Practice Guidelines    Rachael Galan  Dietetic Intern  Cardiac/Oncology Pager: [phone]

## 2017-02-28 NOTE — PLAN OF CARE
Problem: Individualization  Goal: Patient Preferences  Outcome: No Change  Pt up with an asst of 1 and a walker.  Up in chair most the day.  Pt having some discomfort with ambulation with left hip.  MD aware.  Pelvic xray ordered.  Pt kirt PO well. Pt confused and disoriented x4.  But pleasantly confused.  Pt had a sm. BM but no void yet.  BP elevated and given scheduled lopressor and PRN hydralazine.  Will cont to monitor.

## 2017-02-28 NOTE — PLAN OF CARE
Problem: Goal Outcome Summary  Goal: Goal Outcome Summary  Outcome: No Change  A&O to self only. BP elevated initially - within parameters at recheck, all other VSS on room air. Bedrest, turned/repositioned every 2 hours.Pt pulled out IV, new IV placed in left forearm. Pt still due to void, bladder scan 166.  Poor appetite. Discharge plan pending.

## 2017-02-28 NOTE — PLAN OF CARE
Problem: Goal Outcome Summary  Goal: Goal Outcome Summary  Outcome: No Change  Patient alert, disorientated x4, confused. Pulled out PIV mitt in place. Hypertensive. Scheduled metoprolol and PRN hydralazine given and was effective. Unable to void. Straight cathed for 700. Turned and repo q2hrs. Fill liquid diet- poor appetite. Plan for PT/OT. Will continue to monitor.

## 2017-02-28 NOTE — PROGRESS NOTES
[facility]    Hospitalist Progress Note    Date of Service (when I saw the patient): 02/28/2017    Assessment & Plan   Ms. Erickson is a markedly pleasant 89 year old woman with progressive advanced chronic dementia, CKD Stage 3, hypertension, dyslipidemia, and B12 deficiency who was admitted 2/25/2017 for evaluation of acute metabolic encephalopathy due to JAY.     1) Acute metabolic encephalopathy due to JAY: Likely hypovolemic JAY due to low PO intake at her facility, compounded by possible adverse side effects of recently instituted Lexapro and also a recent fall. UA negative for infection and renal ultrasound negative for obstruction. Geovanna was 77.    -- Renal function slowly seems to improve with IV fluid resuscitation; mental status also improving. Will add some free water today for mild concomitant hypernatremia.    -- Daily BMP    -- Continue Seroquel, prn Haldol for agitation; holding Lexapro    -- Overall she has a limited care plan in place; her family have declined brain imaging tests    2) Recent fall: Working with PT she had significant LLE pain.    -- Pelvic x-ray to rule out an occult fracture    3) Hypertension: Resume oral Metoprolol. Stop nitro patch. PRN Hydralazine also ordered.    I have discussed her case today with her daughter and son-in-law at the bedside.    DVT Prophylaxis: Pneumatic Compression Devices  Code Status: DNR/DNI    Disposition: Expected discharge in 2-3 days.    Aly Stevens MD    Interval History   Seems more alert and a lot calmer today, denies any acute complaints.    -Data reviewed today: I reviewed all new labs and imaging results over the last 24 hours. I personally reviewed no images or EKG's today.    Physical Exam   Temp: 97.6  F (36.4  C) Temp src: Axillary BP: 146/60   Heart Rate: 83 Resp: 18 SpO2: 97 % O2 Device: None (Room air)    Vitals:    02/25/17 1245 02/26/17 0620   Weight: 56.7 kg (125 lb) 58.6 kg (129 lb 3 oz)     Vital Signs with  Ranges  Temp:  [97.2  F (36.2  C)-98.7  F (37.1  C)] 97.6  F (36.4  C)  Heart Rate:  [61-94] 83  Resp:  [18-28] 18  BP: (146-231)/() 146/60  SpO2:  [92 %-98 %] 97 %  I/O last 3 completed shifts:  In: 1216 [P.O.:360; I.V.:856]  Out: 1225 [Urine:1225]    Constitutional: Alert and oriented to person only; no apparent distress  HEENT: normocephalic moist mucus membranes  Respiratory: lungs clear to auscultation bilaterally  Cardiovascular: regular S1 S2 no murmurs rubs or gallops  GI: abdomen soft non tender non distended bowel sounds positive  Skin: no rash, good turgor  Musculoskeletal: no clubbing, cyanosis or edema  Neuro: EOMI; moves all four extremities  Psych: Appropriate affect, limited insight and judgment      Medications     NaCl 50 mL/hr at 02/28/17 0840       nitroglycerin   Transdermal Q24h     nitroglycerin   Transdermal Q8H     nitroglycerin  1 patch Transdermal Daily     pantoprazole (PROTONIX) EC tablet 40 mg  40 mg Oral QAM AC     polyethylene glycol  17 g Oral Daily     QUEtiapine (SEROquel) tablet 25 mg  25 mg Oral Daily     QUEtiapine (SEROquel) half-tab 12.5 mg  12.5 mg Oral QAM     senna-docusate  1 tablet Oral Daily     metoprolol  5 mg Intravenous Q6H       Data     Recent Labs  Lab 02/28/17  0730 02/27/17  0707 02/26/17  1111   WBC 6.9 6.3 8.0   HGB 9.6* 9.2* 9.5*   MCV 85 86 84    167 176   * 149* 147*   POTASSIUM 3.6 3.9 4.1   CHLORIDE 120* 121* 118*   CO2 18* 19* 18*   BUN 25 27 31*   CR 1.43* 1.69* 2.17*   ANIONGAP 10 9 11   KENTON 8.5 8.2* 7.9*   * 88 112*   ALBUMIN  --   --  2.9*   PROTTOTAL  --   --  6.2*   BILITOTAL  --   --  0.7   ALKPHOS  --   --  151*   ALT  --   --  27   AST  --   --  30       No results found for this or any previous visit (from the past 24 hour(s)).

## 2017-03-01 NOTE — PROGRESS NOTES
Notified by RN patient acutely agitated with elevated BP's, and refusing all oral medications and pulled out IV lines. Qtc last checked 2/25 normal. Ordered for IM haldol 5mg with option to repeat x 1 if remains agitated. Once calm may resume oral medications as previously ordered.

## 2017-03-01 NOTE — PROVIDER NOTIFICATION
MD Notification    Notified Person:  MD    Notified Persons Name: Dr. Saab    Notification Date/Time: 2/28/17 2100    Notification Interaction:  Talked with Physician    Purpose of Notification: Pt's BP high and pt pulled out IV and is restless.    Orders Received: Order 1x dose Seroquel and try to have pt take PO meds    Comments:

## 2017-03-01 NOTE — PLAN OF CARE
Problem: Goal Outcome Summary  Goal: Goal Outcome Summary  Pt A&O to self only. C/o of pain/discomfort in left hip. Was calm and cooperative at start of shift. Became increasingly agitated after going downstairs for pelvic xray. Pt pulled out IV, is now refusing all cares and medications. Able to get a blood pressure but patient very agitated at this time, 222/131. Charge nurse paged MD who ordered one time dose of seroquel PO but pt would not take. MD Botello paged at 2300 who ordered IM Haldol, next shift will administer. Pt still not voiding, will try and bladder scan if patient becomes more cooperative.

## 2017-03-01 NOTE — PLAN OF CARE
Problem: Goal Outcome Summary  Goal: Goal Outcome Summary  Outcome: No Change  Patient agitated, combative, and delirious. Pulled out IV on previous shift. IM haldol given x1. Patient remains agitated. Unable to void bladder scanned for 311. Attempted to straight cath pt, however pt too combative. Hitting/yelling at staff. BP's elevated. Paged on call for another dose of haldol. Will continue to monitor.     Addendum: Another dose of IM haldol given at 0247. Patient now calm and sleeping at 0430, able to get L PIV in. Fluids re-started. PRN hydralazine given for hypertension. Right mitt on to prevent patient from pulling out IV.   Able to straight cath at 0630 500 out.

## 2017-03-01 NOTE — PROGRESS NOTES
[facility]    Hospitalist Progress Note    Date of Service (when I saw the patient): 03/01/2017    Assessment & Plan   Ms. Erickson is a markedly pleasant 89 year old woman with progressive advanced chronic dementia, CKD Stage 3, hypertension, dyslipidemia, and B12 deficiency who was admitted 2/25/2017 for evaluation of acute metabolic encephalopathy due to JAY.     1) Acute metabolic encephalopathy due to JAY: Creatinine 2.26 at admission, from baseline 1.01. Likely hypovolemic JAY due to low PO intake at her facility, compounded by possible adverse side effects of recently instituted Lexapro and also a recent fall. UA negative for infection and renal ultrasound negative for obstruction. Geovanna was 77.    -- Renal function has slowly seemed to improve with IV fluid resuscitation. It dropped to 1.43 yesterday. This AM it is 1.6. Will continue IV fluid a little longer at 50 cc/hour    --  We have also added some free water for mild concomitant hypernatremia. Na 151 today; will increase the free water to 100 ml q4 hours    -- Daily BMP    -- She sun-downed last night and required IM haldol. Today we will continue Seroquel and switch to a scheduled low dose of Zyprexa 2.5 mg tonight. Hopefully this will help prevent agitation overnight here and could then be continued at her memory care facility upon discharge    -- We are holding Lexapro for side effects noted as an outpatient; likely to stop this at discharge as well    -- Overall she has a limited care plan in place; her family have declined brain imaging tests    2) Recent fall: Working with PT she had significant LLE pain.    -- Pelvic x-ray on 2/28 was negative for occult fracture    3) Hypertension: Resumed oral Metoprolol. Stopped nitro patch. PRN Hydralazine also ordered.    4) Chronic anemia: HGB around 9; no signs of bleeding; monitor.    I discussed her case today with her daughter and son-in-law at the bedside on 2/28. We discussed Palliative  care and hospice care and they would be open to these care modalities for Ms. Erickson in the future if she gets sicker or her mental status deteriorates further. Her overall care plan for now is also meant to focus predominantly on comfort and supportive care.    DVT Prophylaxis: Pneumatic Compression Devices  Code Status: DNR/DNI    Disposition: Expected discharge in 1-2 more days, pending stabilization of renal function and sodium level and improvement in night time agitation.    Aly Stevens MD    Interval History   Very agitated overnight. Very sleepy today.    -Data reviewed today: I reviewed all new labs and imaging results over the last 24 hours. I personally reviewed no images or EKG's today.    Physical Exam   Temp: 98.4  F (36.9  C) Temp src: Oral BP: 151/61   Heart Rate: 91 Resp: 16 SpO2: 93 % O2 Device: None (Room air)    Vitals:    02/25/17 1245 02/26/17 0620   Weight: 56.7 kg (125 lb) 58.6 kg (129 lb 3 oz)     Vital Signs with Ranges  Temp:  [97.1  F (36.2  C)-98.4  F (36.9  C)] 98.4  F (36.9  C)  Heart Rate:  [] 91  Resp:  [16-20] 16  BP: (151-222)/() 151/61  SpO2:  [93 %-96 %] 93 %  I/O last 3 completed shifts:  In: 120 [P.O.:120]  Out: 700 [Urine:700]    Constitutional: Somnolent but arousable; no apparent distress  HEENT: normocephalic moist mucus membranes  Respiratory: lungs clear to auscultation bilaterally  Cardiovascular: regular S1 S2 no murmurs rubs or gallops  GI: abdomen soft non tender non distended bowel sounds positive  Skin: no rash, good turgor  Musculoskeletal: no clubbing, cyanosis or edema  Neuro: EOMI; moves all four extremities  Psych: Appropriate affect when awake, limited insight and judgment      Medications     NaCl 50 mL/hr at 03/01/17 0419       OLANZapine zydis  2.5 mg Oral At Bedtime     multivitamin, therapeutic with minerals  1 tablet Oral Daily     metoprolol  50 mg Oral BID     pantoprazole (PROTONIX) EC tablet 40 mg  40 mg Oral QAM AC      polyethylene glycol  17 g Oral Daily     QUEtiapine (SEROquel) tablet 25 mg  25 mg Oral Daily     QUEtiapine (SEROquel) half-tab 12.5 mg  12.5 mg Oral QAM     senna-docusate  1 tablet Oral Daily       Data     Recent Labs  Lab 03/01/17  1405 02/28/17  0730 02/27/17  0707 02/26/17  1111   WBC 5.4 6.9 6.3 8.0   HGB 9.0* 9.6* 9.2* 9.5*   MCV 86 85 86 84    176 167 176   * 148* 149* 147*   POTASSIUM 3.9 3.6 3.9 4.1   CHLORIDE 123* 120* 121* 118*   CO2 19* 18* 19* 18*   BUN 27 25 27 31*   CR 1.65* 1.43* 1.69* 2.17*   ANIONGAP 9 10 9 11   KENTON 8.1* 8.5 8.2* 7.9*   * 104* 88 112*   ALBUMIN  --   --   --  2.9*   PROTTOTAL  --   --   --  6.2*   BILITOTAL  --   --   --  0.7   ALKPHOS  --   --   --  151*   ALT  --   --   --  27   AST  --   --   --  30       Recent Results (from the past 24 hour(s))   XR Pelvis 1/2 Views    Narrative    PELVIS ONE TO TWO VIEWS February 28, 2017 6:32 PM     HISTORY: Left hip and pelvic pain on ambulation.    COMPARISON: None.       Impression    IMPRESSION: A frontal view of the pelvis demonstrates no definite  acute fracture or dislocation. Prior plate and screw fixation of the  proximal left humerus noted.    MANNEI DUMAS MD

## 2017-03-01 NOTE — PLAN OF CARE
Problem: Goal Outcome Summary  Goal: Goal Outcome Summary  Outcome: No Change  HTN, unable to take meds today. Haldol on PM shift and very sleepy today, refused PO, refused meds. Arouses to voice, denies hallucinations, not willing/able to participate. Likely DC back to memory care tomorrow.  3:01 PM: pt given 10mg hydralazine for HTN, would not take PO

## 2017-03-01 NOTE — PROGRESS NOTES
Hospitalist X-cover    Pt aggitated with HTN, pulled out iv.  Will try one time seroquel, 12.5 and she can get her metoprolol also.    FIORDALIZA Saab MD

## 2017-03-02 NOTE — PLAN OF CARE
Problem: Goal Outcome Summary  Goal: Goal Outcome Summary  Outcome: No Change  Pt very sleep this evening. Did wake up to take medication around 1600. Unable to wake pt up for HS BP medication. PRN hydralazine given x1. No urine output on day shift., pt bladder scanned and straight cath around 1745.

## 2017-03-02 NOTE — PLAN OF CARE
Problem: Goal Outcome Summary  Goal: Goal Outcome Summary  Outcome: Improving  A/Ox 2.  Pleasant, VSS.  Up to chair, tolerating regular diet, good appetite. No void by 1330 - bladder scanned for 324, straight cathed per orders for 300 ml. Patient tolerated well. Family at bedside. Probable discharge to memory care tomorrow.

## 2017-03-02 NOTE — PROGRESS NOTES
Paged regarding high BP. Patient unable to tolerate oral PM dose of metoprolol and BP remains elevated despite hydralazine. Ordered one time metoprolol 5mg IV.

## 2017-03-02 NOTE — PROVIDER NOTIFICATION
MD Notification    Notified Person:  MD    Notified Persons Name: Dr. Botello     Notification Date/Time: 3/2/2017, 0100    Notification Interaction:  Talked with Physician    Purpose of Notification: Hypertensive, pt unable to take PO medication d/t lethargy.    Orders Received: Order for one time IV metoprolol dose because pt was unable to take 2200 scheduled PO metoprolol. Manage further elevated BP with PRN hydralazine.     Comments:

## 2017-03-02 NOTE — PLAN OF CARE
Problem: Goal Outcome Summary  Goal: Goal Outcome Summary  PT: Pt slow to respond this AM but with encouragement participating and getting up to chair. Mod A x 2 for transfers, unable to take more than a few steps with walker due to LLE pain.  Rec disch back to memory care with assist with all mobility.

## 2017-03-02 NOTE — PROGRESS NOTES
[facility] Progress Note  [facility]    Met with pt, per her extended length of stay.  Pt was sitting up in a chair by her bed.  She said that she's not feeling well, and talked about the ups and downs she's experienced recently.  She was rather circular in conversation, noting her advanced age (although she couldn't remember how old she is), and said that she knows she can't expect much at this age.  Provided support and prayer.  SH team will be available per additional need or request.                                                                                                                                                   Susy Kinney  Staff   Pager [phone]

## 2017-03-02 NOTE — PROGRESS NOTES
[facility]    Hospitalist Progress Note    Date of Service (when I saw the patient): 03/02/2017    Assessment & Plan   Mallorie Erickson is a 89 year old female with progressive advanced chronic dementia, CKD Stage 3, hypertension, dyslipidemia, and B12 deficiency who was admitted 2/25/2017 for evaluation of acute metabolic encephalopathy due to JAY.      Acute metabolic encephalopathy due to JAY - resolving  Creatinine 2.26 at admission, from baseline 1.01. Likely hypovolemic JAY due to low PO intake at her facility, compounded by possible adverse side effects of recently instituted Lexapro and also a recent fall. UA negative for infection and renal ultrasound negative for obstruction. Geovanna was 77.  -- Renal function has slowly improved with IV fluid resuscitation.   -- Cr 1.44 today.   -- C/w IVF and adjust to D5W as below.     Dementia / Delirium  H/o progressive advanced chronic dementia.  On Seroquel 12.5 mg Qam, 25 mg Q 4pm and 12.5 mg Q 4 prn PTA.    - Continued on am and afternoon dose on admit.    - She sun-downed 2/28 and required IM haldol. 3/1 was given Zyprexa at bedtime but overnight was lethargic and hard to arouse and unable to take po meds.   - 3/2 stopped Zyprexa.  C/w PTA Seroquel dosing and add back her Q 4 hr prn dosing.    - We are holding Lexapro for side effects noted as an outpatient; likely to stop this at discharge as well  -- Overall she has a limited care plan in place; her family have declined brain imaging tests    Hypernatremia  Metabolic Alkalosis with hypochloridemia  Related to NS IVF.  Will stop NS and free water flushes and change fluids to D5W at 75 cc/hr for now.  - Follow BMP for improvement.   - Na this evening.         Recent fall  Working with PT she had significant LLE pain.  -- Pelvic x-ray on 2/28 was negative for occult fracture     Hypertension  Resumed oral Metoprolol. Stopped nitro patch.   - PRN Hydralazine also ordered.     Chronic anemia  HGB around 9;  no signs of bleeding; monitor.       DVT Prophylaxis: Pneumatic Compression Devices    Code Status: DNR/DNI.  It was discussed between Dr. Stevens and her daughter and son-in-law at the bedside on 2/28. They discussed Palliative care and hospice care and they would be open to these care modalities for Ms. Erickson in the future if she gets sicker or her mental status deteriorates further. Her overall care plan for now is also meant to focus predominantly on comfort and supportive care.     Disposition:  Pending continued improvement in her renal fxn and down trending of her NA levels.  D/c Zyprexa and add prn Seroquel.  Anticipate another 1-2 days.       Keith Schneider       Interval History   Sodium levels trending up.  Seemed to be oversedated overnight and zyprexa d/c'd.  Better this am. Some elevated BP's as well overnight but unable to receive po meds earlier in the evening.      -Data reviewed today: I reviewed all new labs and imaging results over the last 24 hours. I personally reviewed no images or EKG's today.    Physical Exam   Temp: 97.4  F (36.3  C) Temp src: Axillary BP: 183/84 Pulse: 53 Heart Rate: 91 Resp: 16 SpO2: 95 % O2 Device: None (Room air)    Vitals:    02/25/17 1245 02/26/17 0620 03/02/17 0300   Weight: 56.7 kg (125 lb) 58.6 kg (129 lb 3 oz) 55.9 kg (123 lb 3.8 oz)     Vital Signs with Ranges  Temp:  [97.1  F (36.2  C)-97.8  F (36.6  C)] 97.4  F (36.3  C)  Pulse:  [53-90] 53  Heart Rate:  [57-91] 91  Resp:  [16] 16  BP: (175-207)/(59-84) 183/84  SpO2:  [93 %-95 %] 95 %  I/O last 3 completed shifts:  In: 446 [I.V.:446]  Out: 775 [Urine:775]    Gen: Patient in no acute distress.  Appears comfortable. Pleasantly confused.  Eating lunch.   Heart:  S1S2+, regular rate and rhythm, No murmurs.  Lungs:  Clear to auscultation, no wheezing, no rales.   Abdomen:  Soft, non tender, non distended, bowel sounds positive.  Extremities:  Trace edema.    Medications     NaCl 50 mL/hr at 03/02/17 0015        OLANZapine zydis  2.5 mg Oral At Bedtime     sodium chloride (PF)  3 mL Intracatheter Q8H     multivitamin, therapeutic with minerals  1 tablet Oral Daily     metoprolol  50 mg Oral BID     pantoprazole (PROTONIX) EC tablet 40 mg  40 mg Oral QAM AC     polyethylene glycol  17 g Oral Daily     QUEtiapine (SEROquel) tablet 25 mg  25 mg Oral Daily     QUEtiapine (SEROquel) half-tab 12.5 mg  12.5 mg Oral QAM     senna-docusate  1 tablet Oral Daily       Data     Recent Labs  Lab 03/02/17  0745 03/01/17  1405 02/28/17  0730  02/26/17  1111   WBC 5.7 5.4 6.9  < > 8.0   HGB 8.8* 9.0* 9.6*  < > 9.5*   MCV 85 86 85  < > 84    154 176  < > 176   * 151* 148*  < > 147*   POTASSIUM 3.8 3.9 3.6  < > 4.1   CHLORIDE 125* 123* 120*  < > 118*   CO2 18* 19* 18*  < > 18*   BUN 28 27 25  < > 31*   CR 1.44* 1.65* 1.43*  < > 2.17*   ANIONGAP 9 9 10  < > 11   KENTON 8.0* 8.1* 8.5  < > 7.9*   GLC 97 113* 104*  < > 112*   ALBUMIN  --   --   --   --  2.9*   PROTTOTAL  --   --   --   --  6.2*   BILITOTAL  --   --   --   --  0.7   ALKPHOS  --   --   --   --  151*   ALT  --   --   --   --  27   AST  --   --   --   --  30   < > = values in this interval not displayed.    No results found for this or any previous visit (from the past 24 hour(s)).

## 2017-03-02 NOTE — PLAN OF CARE
Problem: Goal Outcome Summary  Goal: Goal Outcome Summary  Outcome: No Change  Pt lethargic overnight. Arousable to continuous stimulation. VANDANA orientation. Hypertensive, given IV metoprolol and PRN hydralazine w/ mild relief. No urine OP overnight. Pt bladder scanned and straight cath per plan or care orders. Plan to DC back to memory care unit today.

## 2017-03-03 NOTE — PLAN OF CARE
Problem: Goal Outcome Summary  Goal: Goal Outcome Summary  Outcome: No Change  Pt hypertensive this evening; prn hydralazine given x2. Pt had no urine output; bladder scan 258. Pt asymptomatic. Repositioned q2h. Oriented to self only.  Na dropped to 147

## 2017-03-03 NOTE — PROGRESS NOTES
[facility]    Hospitalist Progress Note    Date of Service (when I saw the patient): 03/03/2017    Assessment & Plan   Mallorie Erickson is a 89 year old female with progressive advanced chronic dementia, CKD Stage 3, hypertension, dyslipidemia, and B12 deficiency who was admitted 2/25/2017 for evaluation of acute metabolic encephalopathy due to JAY.       Acute metabolic encephalopathy due to JAY - resolving  Creatinine 2.26 at admission, from baseline 1.01. Likely hypovolemic JAY due to low PO intake at her facility, compounded by possible adverse side effects of recently instituted Lexapro and also a recent fall. UA negative for infection and renal ultrasound negative for obstruction. Goevanna was 77.  -- Renal function has slowly improved with IV fluid resuscitation.   -- Cr 1.37 today.   -- C/w IVF and adjusted to D5W as below.      Dementia / Delirium  H/o progressive advanced chronic dementia. On Seroquel 12.5 mg Qam, 25 mg Q 4pm and 12.5 mg Q 4 prn PTA.   - Continued on am and afternoon dose on admit.   - She sun-downed 2/28 and required IM haldol. 3/1 was given Zyprexa at bedtime but overnight was lethargic and hard to arouse and unable to take po meds.  - 3/2 stopped Zyprexa. C/w PTA Seroquel dosing and add back her Q 4 hr prn dosing.   - We are holding Lexapro for side effects noted as an outpatient; likely to stop this at discharge as well  -- Overall she has a limited care plan in place; her family have declined brain imaging tests     Hypernatremia  Metabolic Alkalosis with hypochloridemia  Related to NS IVF. Will stop NS and free water flushes and change fluids to D5W at 75 cc/hr for now.  - Follow BMP for improvement.   - Na 152-->148 since D5 started.     Urinary retention  Has required multiple straight cath's this stay.  Most recent last night for 450cc.   - Difficult situation as a koehler could be pulled during her confusion and put her at risk for UTI but her retention could also be driving  some agitation and HTN.     - Will encourage Rn staff to ambulate her more and c/w straight cath for now.  Consider a koehler if no improvement.         Recent fall  Working with PT she had significant LLE pain.  -- Pelvic x-ray on 2/28 was negative for occult fracture      Hypertension  Resumed oral Metoprolol. Stopped nitro patch placed on amdit.  - Continues to be hypertensive with BP at times > 200.    - Add Norvasc 10 mg daily 3/3.  Titrate BB as tolerated.    - PRN Hydralazine also ordered.      Chronic anemia  HGB around 9; no signs of bleeding; monitor.         DVT Prophylaxis: Pneumatic Compression Devices     Code Status: DNR/DNI. It was discussed between Dr. Stevens and her daughter and son-in-law at the bedside on 2/28. They discussed Palliative care and hospice care and they would be open to these care modalities for Ms. Erickson in the future if she gets sicker or her mental status deteriorates further. Her overall care plan for now is also meant to focus predominantly on comfort and supportive care.      Disposition: Pending continued improvement in her renal fxn and down trending of her NA levels. Improving. Add Norvasc for HTN.   Possibly tomorrow if the above improved.        Keith Schneider       Interval History   Hypertension continues to be a problem. Not as agitated last night but still with urinary retention needing straight cath. Na improving.       -Data reviewed today: I reviewed all new labs and imaging results over the last 24 hours. I personally reviewed no images or EKG's today.    Physical Exam   Temp: 97.7  F (36.5  C) Temp src: Oral BP: (!) 215/94   Heart Rate: 81 Resp: 16 SpO2: 95 % O2 Device: None (Room air)    Vitals:    02/26/17 0620 03/02/17 0300 03/03/17 0523   Weight: 58.6 kg (129 lb 3 oz) 55.9 kg (123 lb 3.8 oz) 56 kg (123 lb 7.3 oz)     Vital Signs with Ranges  Temp:  [96.2  F (35.7  C)-98.2  F (36.8  C)] 97.7  F (36.5  C)  Heart Rate:  [63-81] 81  Resp:  [16] 16  BP:  (167-215)/(60-94) 215/94  SpO2:  [95 %-96 %] 95 %  I/O last 3 completed shifts:  In: 390 [P.O.:390]  Out: 700 [Urine:700]    Gen: Patient in no acute distress.  Appears comfortable. Pleasantly confused.   Heart:  S1S2+, regular rate and rhythm, No murmurs.  Lungs:  Clear to auscultation, no wheezing, no rales.   Abdomen:  Soft, non tender, non distended, bowel sounds positive.  Extremities:  No edema.    Medications     D5W 75 mL/hr at 03/03/17 0151       amLODIPine  10 mg Oral Daily     sodium chloride (PF)  3 mL Intracatheter Q8H     multivitamin, therapeutic with minerals  1 tablet Oral Daily     metoprolol  50 mg Oral BID     pantoprazole (PROTONIX) EC tablet 40 mg  40 mg Oral QAM AC     polyethylene glycol  17 g Oral Daily     QUEtiapine (SEROquel) tablet 25 mg  25 mg Oral Daily     QUEtiapine (SEROquel) half-tab 12.5 mg  12.5 mg Oral QAM     senna-docusate  1 tablet Oral Daily       Data     Recent Labs  Lab 03/03/17  0740 03/02/17  2020 03/02/17  0745 03/01/17  1405  02/26/17  1111   WBC 6.4  --  5.7 5.4  < > 8.0   HGB 9.1*  --  8.8* 9.0*  < > 9.5*   MCV 85  --  85 86  < > 84   *  --  152 154  < > 176   * 147* 152* 151*  < > 147*   POTASSIUM 3.7  --  3.8 3.9  < > 4.1   CHLORIDE 119*  --  125* 123*  < > 118*   CO2 20  --  18* 19*  < > 18*   BUN 30  --  28 27  < > 31*   CR 1.37*  --  1.44* 1.65*  < > 2.17*   ANIONGAP 9  --  9 9  < > 11   KENTON 8.3*  --  8.0* 8.1*  < > 7.9*   *  --  97 113*  < > 112*   ALBUMIN  --   --   --   --   --  2.9*   PROTTOTAL  --   --   --   --   --  6.2*   BILITOTAL  --   --   --   --   --  0.7   ALKPHOS  --   --   --   --   --  151*   ALT  --   --   --   --   --  27   AST  --   --   --   --   --  30   < > = values in this interval not displayed.    No results found for this or any previous visit (from the past 24 hour(s)).

## 2017-03-03 NOTE — PLAN OF CARE
Problem: Goal Outcome Summary  Goal: Goal Outcome Summary  Outcome: No Change  Pt alert. Disoriented place/time/situation. Increasingly agitated throughout shift, given PRN seroquel. Hypertensive, given PRN hydralazine x1. Otherwise VSS. Up A2, up to chair for meals. No urine output overnight. BS for 420cc, Straight cathed for 450. Pt pulled out newly placed IV, paged flying squad - waiting to hear back.  Plan to DC back to memory care unit today.

## 2017-03-03 NOTE — PROGRESS NOTES
Social Work Progress Note     D:   SW following for discharge planning. Per MD's note this AM, patient may be read to discharge back to her [facility] this weekend. Pt resides at [facility] (Address: [address] [place] [address]).   A/I: SW left message for RN manager, Chiquita, at [facility] in [place], [place] (ph:[phone]), requesting phone call back to clarify if they can take pt back on the weekend, where any new orders should be faxed to, and if new medications should be sent to a specific pharmacy.     P: Will continue to follow for support and d/c planning.      WALT Herbert [facility]  [phone]          Social Work Progress Note - ADDENDUM    D: RAFAELA spoke with SHARON Porras, from [facility]. Chiquita confirms they do have staff that work on the weekend, however they might not have a RN on staff to accept and review orders. Chiquita states she can be reached on her cell phone at [phone] when a discharge time is determined, and she may be able to come in to receive orders and patient.     P: SW will reach out to SHARON Porras, (ph:[phone]) when discharge is determined.       WALT Herbert [facility]  [phone]

## 2017-03-03 NOTE — PLAN OF CARE
Problem: Goal Outcome Summary  Goal: Goal Outcome Summary  Outcome: No Change  Pt c/o left leg pain, tylenol given with relief. D5 infusing. PRN meds for BP, home meds adjusted. Up in halls with 1. Tolerating PO intake. Continue to monitor labs

## 2017-03-04 NOTE — PROGRESS NOTES
Paged by RN regarding lost IV access. Patient with acute on chronic encephalopathy, day team in discussion with family regarding hospice. Plan trending toward comfort emphasis. Patient comfortable at this time. Only free water was being given via IV. OK to leave out tonight, further management per day team tomorrow.

## 2017-03-04 NOTE — PLAN OF CARE
Problem: Goal Outcome Summary  Goal: Goal Outcome Summary  PT-Patient continued to complain of left LE pain with gait and limps on left LE but gait quality improved with increased distance and with patient following cues from PT to advance left LE first. Needs Mod assist for sit tostand from commode and just cues for correct technique when transferring from chair. Recommend disch back to Memory care unit.

## 2017-03-04 NOTE — PROGRESS NOTES
[facility]    Hospitalist Progress Note    Assessment & Plan   Mallorie Erickson is a 89 year old female with PMHx of progressive advanced chronic dementia, stage III CKD, hypertension, dyslipidemia and vitamin B12 deficiency who was admitted on 2/25/2017 with acute metabolic encephalopathy due to acute kidney injury.      Acute metabolic encephalopathy due to JAY: Improving  Baseline Cr seems to be around 1.0. Cr 2.26 on admission. Suspect hypovolemic JAY due to decreased PO intake at her facility, compounded by possible adverse side effects of recently instituted Lexapro and also a recent fall. UA negative for infection and renal ultrasound negative for obstruction. Geovanna was 77.  -- renal function slowly improving after IVFs this stay, though now with mild upward trend overnight: 1.37 -> 1.69  -- have lost IV access -> keep off IVFs for now and encourage po intake, if Cr continues to trend up will need to replace IV and resume IVFs       Advanced Dementia / Delirium:  Has known progressive advanced chronic dementia. Has a limited care plan in place; family has declined brain imaging studies  PTA regimen includes Seroquel 12.5mg qAM, 25mg q4pm and 12.5mg q4h prn  -- conts on PTA regimen of Seroquel including prn dosing  -- had episode of sundowning on 2/28 and required dose of IM Haldol  -- Zyprexa HS was added on 3/1 but resulted in significant lethargy so it was stopped  -- Lexapro held this stay given side effects noted as an outpatient, do not anticipate resuming at discahrge      Hypernatremia, Metabolic Alkalosis with hypochloridemia:  Related to initial hydration with NS. IVFs changed from NS to D5W on 3/2 with noted improvement in Na.   -- Na has since normalized  -- mild metabolic acidosis persists  -- cont serial BMPs    Urinary retention:  Has required multiple straight cath's this stay with upwards of 450cc on bladder this stay.   -- difficult situation as koehler may contribute to confusion and  place her at risk for UTI, though note retention could be driving some agitation and hypertension  -- no koehler for now, encourage ambulation and straight caths prn -> if she continues to retain large amounts of urine may need koehler       Recent fall:  Had significant LLE pain when working with therapy this stay. Pelvic xray on 22/8 was neg for fracture.       Hypertension:  PTA meds: metoprolol 50mg BID.   -- Metoprolol continued on admission  -- BPs remained elevated this stay, into 200s at times, amlodipine 10mg daily started on 3/3  -- has prn hydralazine (IV/po)  -- monitor BPs today, allow for some permissive hypertension given her age -> if remain BPs elevated may need to add scheduled Hydralazine      Chronic anemia:  Baseline hgb is around 9.   -- hgb stable, no s/sx of bleeding this stay,    FEN: lost IV access overnight, hold IVFs today and encourage po intake, lytes stable, regular diet  DVT Prophylaxis: PCDs  Code Status: DNR/DNI. This as discussed between Dr. Stevens and her daughter and son-in-law at the bedside on 2/28. They discussed Palliative care and hospice care and they would be open to these care modalities in the future if she gets sicker or her mental status deteriorates further. Her overall care plan for now is also meant to focus predominantly on comfort and supportive care.    Disposition: Anticipate discharge back to memory care unit tomorrow pending stable renal function and improvement in BP.     Sugar Whitaker    Interval History   Lost IV access overnight, unable to place new IV despite multiple attempts. Pleasantly confused this morning. Ate most of breakfast. No concerns per RN. BPs elevated this morning, >200 prior to morning meds.    -Data reviewed today: I reviewed all new labs and imaging results over the last 24 hours. I personally reviewed no images or EKG's today.    Physical Exam   Temp: 96  F (35.6  C) Temp src: Oral BP: 193/65   Heart Rate: 71 Resp: 16 SpO2: 97 %  O2 Device: None (Room air)    Vitals:    03/02/17 0300 03/03/17 0523 03/04/17 0558   Weight: 55.9 kg (123 lb 3.8 oz) 56 kg (123 lb 7.3 oz) 58.3 kg (128 lb 8.5 oz)     Vital Signs with Ranges  Temp:  [96  F (35.6  C)-97.5  F (36.4  C)] 96  F (35.6  C)  Heart Rate:  [53-71] 71  Resp:  [16-18] 16  BP: (153-211)/(58-91) 193/65  SpO2:  [95 %-98 %] 97 %  I/O last 3 completed shifts:  In: 1370 [P.O.:810; I.V.:560]  Out: 250 [Urine:250]    Constitutional: Resting comfortably, alert and pleasantly confused, not oriented to self/location/year, NAD  Respiratory: CTAB, no wheeze/rales/rhonchi  Cardiovascular: HRRR, no MGR  GI: S, NT, +BS  Skin/Integumen: warm/dry  Other: trace bilateral LE edema    Medications     D5W Stopped (03/04/17 0012)       amLODIPine  10 mg Oral Daily     sodium chloride (PF)  3 mL Intracatheter Q8H     multivitamin, therapeutic with minerals  1 tablet Oral Daily     metoprolol  50 mg Oral BID     pantoprazole (PROTONIX) EC tablet 40 mg  40 mg Oral QAM AC     polyethylene glycol  17 g Oral Daily     QUEtiapine (SEROquel) tablet 25 mg  25 mg Oral Daily     QUEtiapine (SEROquel) half-tab 12.5 mg  12.5 mg Oral QAM     senna-docusate  1 tablet Oral Daily       Data     Recent Labs  Lab 03/04/17  0736 03/03/17  0740 03/02/17  2020 03/02/17  0745  02/26/17  1111   WBC 5.5 6.4  --  5.7  < > 8.0   HGB 8.7* 9.1*  --  8.8*  < > 9.5*   MCV 84 85  --  85  < > 84   * 141*  --  152  < > 176    148* 147* 152*  < > 147*   POTASSIUM 3.6 3.7  --  3.8  < > 4.1   CHLORIDE 116* 119*  --  125*  < > 118*   CO2 18* 20  --  18*  < > 18*   BUN 35* 30  --  28  < > 31*   CR 1.69* 1.37*  --  1.44*  < > 2.17*   ANIONGAP 9 9  --  9  < > 11   KENTON 8.1* 8.3*  --  8.0*  < > 7.9*   * 133*  --  97  < > 112*   ALBUMIN  --   --   --   --   --  2.9*   PROTTOTAL  --   --   --   --   --  6.2*   BILITOTAL  --   --   --   --   --  0.7   ALKPHOS  --   --   --   --   --  151*   ALT  --   --   --   --   --  27   AST  --   --   --    --   --  30   < > = values in this interval not displayed.    No results found for this or any previous visit (from the past 24 hour(s)).

## 2017-03-04 NOTE — PLAN OF CARE
Problem: Goal Outcome Summary  Goal: Goal Outcome Summary  Outcome: No Change  Pt alert. Disoriented time/situation. Increasingly agitated throughout shift, given PRN seroquel. VSS- except BP but PRN parameters not met and metoprolol not given due to HR decreased. Up A2, up to chair for meals. Voided x2. Pt lost IV access on-call MD aware. Plan to DC back to memory care unit today.

## 2017-03-05 NOTE — PROVIDER NOTIFICATION
CRNA unable to place IV and advise placing a line. Notified Dr. Whitaker that BP continues to be elevated and pt is not awake enough for PO meds. Per Dr. Whitaker, continue to monitor pressures and call back if SBP > 200. Give BP meds later if patient wakes up enough.

## 2017-03-05 NOTE — PLAN OF CARE
Problem: Goal Outcome Summary  Goal: Goal Outcome Summary  PT-Attempted to see. Patient was sound asleep.

## 2017-03-05 NOTE — PROVIDER NOTIFICATION
MD Notification    Notified Person:  MD    Notified Persons Name: Dr. Whitaker    Notification Date/Time: 1215    Notification Interaction:  Text paged Physician    Purpose of Notification:VERY lethargic this am ( This may be possibly poss due to combination of Seroquel given last at 0136 and probable dehydration) Pt still has NO IV access since 3-4-17 0012 - MD's were reportedly aware of this). Night shift nurse reported that previous attempts by IV therpists were unsuccessful.  Continues to have HTN. / 77 at 12 noon  Due to lethargy  is unable to take anything po- she pools liquids in her mouth and doesn't swallow. Great aspir risk.    Has urinary retention requiring straight cathing    Orders Received: awaiting reply    Comments:

## 2017-03-05 NOTE — PROGRESS NOTES
[facility]    Hospitalist Progress Note    Assessment & Plan   Mallorie Erickson is a 89 year old female with PMHx of progressive advanced chronic dementia, stage III CKD, hypertension, dyslipidemia and vitamin B12 deficiency who was admitted on 2/25/2017 with acute metabolic encephalopathy due to acute kidney injury.      Acute metabolic encephalopathy due to JAY:   Baseline Cr seems to be around 1.0. Cr 2.26 on admission. Suspect hypovolemic JAY due to decreased PO intake at her facility, compounded by possible adverse side effects of recently instituted Lexapro and also a recent fall. UA negative for infection and renal ultrasound negative for obstruction. Geovanna was 77.  -- renal function slowly improved after IVFs this stay, though now with upward trend since lost IV access lost 3/3 PM: 1.37 -> 1.69 -> 1.71 today  -- po intake remains minimal despite attempts at encouragement  -- will attempt to place new IV today and resume IVFs      Advanced Dementia / Delirium:  Has known progressive advanced chronic dementia. Has a limited care plan in place; family has declined brain imaging studies  PTA regimen includes Seroquel 12.5mg qAM, 25mg q4pm and 12.5mg q4h prn  -- conts on PTA regimen of sched and prn Seroquel  -- had episode of sundowning on 2/28 and required dose of IM Haldol  -- Zyprexa HS was added on 3/1 but resulted in significant lethargy so it was stopped  -- Lexapro held this stay given side effects noted as an outpatient (reported as increased confusion and babbling), do not anticipate resuming at discahrge      Hypernatremia, Metabolic Alkalosis with hypochloridemia:  Related to initial hydration with NS. IVFs changed from NS to D5W on 3/2 with noted improvement in Na.   -- Na has since normalized  -- mild metabolic acidosis persists   -- cont serial BMPs    Urinary retention:  Has required multiple straight cath's this stay with upwards of 450cc on bladder this stay.   -- difficult situation  as koehler may contribute to confusion and place her at risk for UTI, though note retention could be driving some agitation and hypertension  -- no koehler for now, encourage ambulation and straight caths prn -> if she continues to retain large amounts of urine may need koehler       Recent fall:  Had significant LLE pain when working with therapy this stay. Pelvic xray on 22/8 was neg for fracture.       Hypertension:  PTA meds: metoprolol 50mg BID. Have had difficult time managing BPs this stay. Metoprolol was continued on admission.   -- allow for some permissive hypertension given her age, though BPs have consistently been 180-200s this stay  -- amlodipine 10mg daily started on 3/3  -- metoprolol increased to 75mg BID on 3/5  -- hydralazine 25mg TID also added 3/5      Chronic anemia:  Baseline hgb is around 9.   -- hgb stable, no s/sx of bleeding this stay    Irregular HR:  On exam today, HR regular though with brief periods of irregularity. No noted hx of arrhythmia  -- will check EKG    ADDENDUM: EKG shows SR w/PACs    FEN: resume IVFs w/D5 1/2NS @100ml/h, lytes stable, regular diet once alert  DVT Prophylaxis: PCDs  Code Status: DNR/DNI. As discussed between Dr. Stevens and patient's daughter/son-in-law on 2/28. They also discussed palliative care and hospice care. Family would be open to these care modalities in the future if Mallorie gets sicker or her mental status deteriorates further. Her overall care plan for now is meant to focus predominantly on comfort and supportive care.    Disposition: Anticipate discharge back to memory care unit in the next 1-2d, pending stable renal function and improvement in BP.    Discussed with patient's daughter Queenie via phone.     Sugar Whitaker    Interval History   Given dose of Seroquel overnight dt agitation and restlessness. More lethargic this morning. Hasn't been eating/drinking much. Continues to require straight caths, most recently cath'd for 400ml. Patient  sleeping during my visit. Rouses to name and tactile stimuli. Isn't able to answer questions, quickly falls back asleep.     -Data reviewed today: I reviewed all new labs and imaging results over the last 24 hours. I personally reviewed no images or EKG's today.    Physical Exam   Temp: 96.2  F (35.7  C) Temp src: Axillary BP: 185/77   Heart Rate: 82 Resp: 20 SpO2: 97 % O2 Device: None (Room air)    Vitals:    03/03/17 0523 03/04/17 0558 03/05/17 0558   Weight: 56 kg (123 lb 7.3 oz) 58.3 kg (128 lb 8.5 oz) 58.1 kg (128 lb 1.4 oz)     Vital Signs with Ranges  Temp:  [96.1  F (35.6  C)-96.3  F (35.7  C)] 96.2  F (35.7  C)  Heart Rate:  [] 82  Resp:  [18-20] 20  BP: (151-193)/(65-77) 185/77  SpO2:  [95 %-98 %] 97 %  I/O last 3 completed shifts:  In: 540 [P.O.:540]  Out: 525 [Urine:525]    Constitutional: Resting quietly, rouses to name/tactile stimuli but unable to answer questions and quickly falls back asleep, NAD  Respiratory: CTAB, no wheeze/rales/rhonchi  Cardiovascular: HRRR though with transient periods of irregularity, no MGR  GI: S, NT, +BS  Skin/Integumen: warm/dry  Other: trace bilateral LE edema    Medications     D5W Stopped (03/04/17 0012)       metoprolol  75 mg Oral BID     amLODIPine  10 mg Oral Daily     multivitamin, therapeutic with minerals  1 tablet Oral Daily     pantoprazole (PROTONIX) EC tablet 40 mg  40 mg Oral QAM AC     polyethylene glycol  17 g Oral Daily     QUEtiapine (SEROquel) tablet 25 mg  25 mg Oral Daily     QUEtiapine (SEROquel) half-tab 12.5 mg  12.5 mg Oral QAM     senna-docusate  1 tablet Oral Daily       Data     Recent Labs  Lab 03/05/17  0710 03/04/17  0736 03/03/17  0740   WBC 6.4 5.5 6.4   HGB 9.0* 8.7* 9.1*   MCV 83 84 85   * 135* 141*   * 143 148*   POTASSIUM 4.0 3.6 3.7   CHLORIDE 116* 116* 119*   CO2 19* 18* 20   BUN 37* 35* 30   CR 1.71* 1.69* 1.37*   ANIONGAP 10 9 9   KENTON 8.2* 8.1* 8.3*   * 114* 133*       No results found for this or any  previous visit (from the past 24 hour(s)).

## 2017-03-05 NOTE — PLAN OF CARE
Problem: Goal Outcome Summary  Goal: Goal Outcome Summary  Outcome: Declining  7a-3p  HR irregular - MD aware and EKG done. Sleep apnea with resp 13-14 and 15 sec sleep apnea. Oa sat 96% RA  Increased lethargy resulting in too sleepy for safe swallowing. So unable to take anything orally. No oral meds given this shift. Md aware  Still no IV access. Dr pedro. Writer anticipating Anesthesia to arrive this afternoon to perih access for IV fluids. When this is done then nurse will need to obtain order for bilat wrist restraints from Dr Whitaker. Pt has reported hx of pulling out her IV lines in past.   Has been on bedrest this shift. Delegated repositioning and am cares to NA. Julianne Cohen stated she'll call family and provide status update.   Continue      [id number] Addendum: Charge nurse reported that IV nurse Lisa did attempt a couple times to obtain periph IV site but was unsuccessful.  Writer still awaiting anesthesia to start line

## 2017-03-05 NOTE — PLAN OF CARE
Problem: Goal Outcome Summary  Goal: Goal Outcome Summary  Outcome: No Change  Pt alert. Disoriented to place/time/situation. Hypertensive, given PRN PO hydralazine. Other VSS. Up A2/walker/GB - extremely unsteady. Ambulated to BR but unable to void. BS for 533. Straight cathed for 520. Reg diet, poor intake. Encourage fluids/PO intake. NO md PAKO aware. Plan to DC back to memory care unit pending creat and BP improvement.

## 2017-03-06 NOTE — DISCHARGE SUMMARY
[facility]    Discharge Summary  Hospitalist    Date of Admission:  2/25/2017  Date of Discharge:  3/6/2017  Discharging Provider: Sugar Whitaker    Discharge Diagnoses   Acute metabolic encephalopathy due to JAY  Advanced Dementia / Delirium  Hypertension  Hypernatremia  Urinary retention  Recent fall  Chronic anemia    History of Present Illness   Mallorie Erickson is a 89 year old female with PMHx of progressive advanced chronic dementia, stage III CKD, hypertension, dyslipidemia and vitamin B12 deficiency who was admitted on 2/25/2017 with acute metabolic encephalopathy due to acute kidney injury. Her stay was complicated by hypertension with SBPs into the 200s.    Hospital Course   Mallorie Erickson was admitted on 2/25/2017.  The following problems were addressed during her hospitalization:    Acute metabolic encephalopathy due to JAY:   Baseline Cr seems to be around 1.0. Cr 2.26 on admission. Suspect hypovolemic JAY due to decreased PO intake at her facility, compounded by possible adverse side effects of recently instituted Lexapro and also a recent fall. UA negative for infection and renal ultrasound negative for obstruction. Geovanna was 77.    Renal function initially improved after administration of IVFs. Lost IV access on 3/3 PM and unable to place new IV. Encouraged po intake. Cr variable, but had improved to 1.54 on the day of discharge.     Will need repeat BMP later this week for reevaluation of renal function and sodium.    Advanced Dementia / Delirium:  Has known progressive advanced chronic dementia. Has a limited care plan in place; family has declined brain imaging studies. PTA regimen includes Seroquel 12.5mg qAM, 25mg q4pm and 12.5mg q4h prn    Maintained on regimen of Seroquel this stay. Had some issues with sundowning. One episode required dose of Haldol. She was given Zyprexa for a separate episode though this resulted in increased lethargy. She also had an episode of lethargy  after a dose of prn Seroquel. Her mentation improved and she was back to her baseline by the time of discharge. Lexapro was held this stay given side effects noted as an outpatient (reported as increased confusion and babbling), was not resumed at discharge.    Discharged back to her memory care unit.     Hypertension:  PTA meds: metoprolol 50mg BID. Have had difficult time managing BPs this stay, systolics up to 200s at times. Changes made to her meds this stay. Metoprolol was increased to 75mg BID. Also started on Norvasc 10mg daily and Hydralazine 25mg TID. BPs had notably improved by the time of discharge. Have written for BP checks twice daily at her facility. Should follow up with provider later this week to determine if medications need to be further adjusted.       Hypernatremia  Metabolic Acidosis with hypochloridemia:  Related to initial hydration with NS. IVFs changed from NS to D5W on 3/2 with noted improvement in Na. After IVFs were stopped, Na crept up - was 146 on day of discharge. Acidosis resolved. Encouraged continued po intake. Recommended repeat BMP later this week to ensure electrolytes remain stable.      Urinary retention:  Has required multiple straight cath's this stay with upwards of 450cc on bladder this stay. Difficult situation as koehler may contribute to confusion and place her at risk for UTI, though note retention could be driving some agitation and hypertension. Was managed with periodic straight caths this stay. Had hoped to continue straight caths after discharge but after discussion with facility, they are unable to do this. Given she was retaining upwards of 700 ml on straight caths this stay, required placement of koehler catheter prior to discharge.       Recent fall:  Had significant LLE pain when working with therapy this stay. Pelvic xray on 22/8 was neg for fracture. Seen by PT, recommended discharge back to her memory care unit.  Orders placed for home care services including  PT/OT and RN.        Chronic anemia:  Baseline hgb is around 9. Hgb remained stable this stay.      Irregular HR:  On exam today, HR regular though with brief periods of irregularity. No noted hx of arrhythmia. EKG showed sinus rhythm with PACs    Sugar Whitaker    Significant Results and Procedures   See imaging studies/labs below.    Code Status   DNR / DNI       Primary Care Physician   Everardo Tate    Physical Exam   Temp: 99  F (37.2  C) Temp src: Oral BP: 152/65 Pulse: 94 Heart Rate: 79 Resp: 18 SpO2: 96 % O2 Device: None (Room air)    Vitals:    03/04/17 0558 03/05/17 0558 03/06/17 0504   Weight: 58.3 kg (128 lb 8.5 oz) 58.1 kg (128 lb 1.4 oz) 55.9 kg (123 lb 3.8 oz)     Vital Signs with Ranges  Temp:  [96.2  F (35.7  C)-99  F (37.2  C)] 99  F (37.2  C)  Pulse:  [94] 94  Heart Rate:  [70-94] 79  Resp:  [14-20] 18  BP: (144-200)/(52-77) 152/65  SpO2:  [95 %-98 %] 96 %  I/O last 3 completed shifts:  In: 240 [P.O.:240]  Out: 1650 [Urine:1650]    General: Resting comfortably, pleasantly confused, not oriented to location/year, NAD  CVS: HRRR, on MGR  Respiratory: CTAB, no wheeze/rales/rhonchi  GI: S, NT, ND, +BS  Ext: no LE edema  Skin: Warm/dry    Discharge Disposition   Discharged to long-term care facility  Condition at discharge: Stable    Consultations This Hospital Stay   PHYSICAL THERAPY ADULT IP CONSULT  NUTRITION SERVICES ADULT IP CONSULT    Time Spent on this Encounter   ISugar, personally saw the patient today and spent greater than 30 minutes discharging this patient.    Discharge Orders     Home care nursing referral     Home Care PT Referral for Hospital Discharge     Home Care OT Referral for Hospital Discharge     Reason for your hospital stay   Management of your confusion and acute kidney injury. You improved after treatment with IV fluids. Your blood pressures remained elevated and you were started on 2 new blood pressure medications this stay.      Follow-up and recommended labs and tests    1. Follow up with PCP/facility physician in the next 3-5 days for reassessment of your blood pressure. Need repeat BMP to assess your sodium and kidney function.     Activity   Your activity upon discharge: activity as tolerated     Discharge Instructions   1. Should have blood pressure checked twice daily - in the morning and evening.   2. Given problems with urinary retention, patient should be straight cath'd twice daily. She may ultimately need placement of a koehler catheter.     Koehler catheter   To straight gravity drainage. Change catheter every 2 weeks and PRN for leaking or decreased uring output with signs of bladder distention. DO NOT change catheter without a specific MD order IF diagnosis of benign prostatic hypertrophy (BPH), neurogenic bladder, or other urological conditions     MD face to face encounter   Documentation of Face to Face and Certification for Home Health Services    I certify that patient: Mallorie Erickson is under my care and that I, or a nurse practitioner or physician's assistant working with me, had a face-to-face encounter that meets the physician face-to-face encounter requirements with this patient on: 3/6/2017.    This encounter with the patient was in whole, or in part, for the following medical condition, which is the primary reason for home health care: advanced alzheimers dementia, hypertension, JAY, weakness/falls.    I certify that, based on my findings, the following services are medically necessary home health services: Nursing, Occupational Therapy and Physical Therapy.    My clinical findings support the need for the above services because: Nurse is needed: to assist with vital signs checks and medications management., Occupational Therapy Services are needed to assess and treat cognitive ability and address ADL safety due to impairment in advanced dementia. and Physical Therapy Services are needed to assess and treat the  following functional impairments: advanced dementia and physical deconditioning.    Further, I certify that my clinical findings support that this patient is homebound (i.e. absences from home require considerable and taxing effort and are for medical reasons or Spiritism services or infrequently or of short duration when for other reasons) because: Mental health symptoms including: Patient gets lost or wanders to due to cognitive impairments...    Based on the above findings. I certify that this patient is confined to the home and needs intermittent skilled nursing care, physical therapy and/or speech therapy.  The patient is under my care, and I have initiated the establishment of the plan of care.  This patient will be followed by a physician who will periodically review the plan of care.  Physician/Provider to provide follow up care: Everardo Tate    Attending hospital physician (the Medicare certified [place] provider): Sugar Whitaker  Physician Signature: See electronic signature associated with these discharge orders.  Date: 3/6/2017     DNR/DNI     Diet   Follow this diet upon discharge: Regular       Discharge Medications   Current Discharge Medication List      START taking these medications    Details   hydrALAZINE (APRESOLINE) 25 MG tablet Take 1 tablet (25 mg) by mouth every 8 hours  Qty: 60 tablet, Refills: 0    Associated Diagnoses: Benign hypertension with chronic kidney disease, stage III      amLODIPine (NORVASC) 10 MG tablet Take 1 tablet (10 mg) by mouth daily  Qty: 30 tablet, Refills: 0    Associated Diagnoses: Benign hypertension with chronic kidney disease, stage III         CONTINUE these medications which have CHANGED    Details   metoprolol (LOPRESSOR) 50 MG tablet Take 1.5 tablets (75 mg) by mouth 2 times daily  Qty: 60 tablet, Refills: 0    Associated Diagnoses: Benign essential hypertension         CONTINUE these medications which have NOT CHANGED    Details    ACETAMINOPHEN PO Take 650 mg by mouth every 6 hours as needed for pain      polyethylene glycol (MIRALAX/GLYCOLAX) powder Take 17 g by mouth daily      !! QUEtiapine Fumarate (SEROQUEL PO) Take 12.5 mg by mouth every morning      !! senna-docusate (SENOKOT-S;PERICOLACE) 8.6-50 MG per tablet Take 1 tablet by mouth daily Hold if loose stools       !! senna-docusate (SENOKOT-S;PERICOLACE) 8.6-50 MG per tablet Take 1 tablet by mouth daily as needed for constipation      !! QUEtiapine Fumarate (SEROQUEL PO) Take 12.5 mg by mouth every 4 hours as needed      MELATONIN PO Take 3 mg by mouth At Bedtime       diclofenac (VOLTAREN) 1 % GEL Place 2 g onto the skin 2 times daily To elbow      Pantoprazole Sodium (PROTONIX PO) Take 40 mg by mouth every morning (before breakfast)      Cholecalciferol (VITAMIN D3 PO) Take 2,000 Units by mouth daily      !! QUEtiapine Fumarate (SEROQUEL PO) Take 25 mg by mouth daily At 4PM      nystatin (MYCOSTATIN) 751044 UNIT/GM POWD Apply bid to groin and under pannus  Qty: 1 Bottle, Refills: 12    Comments: Nurses/ aides to apply until the rash is gone  Associated Diagnoses: Intertrigo      Cyanocobalamin (VITAMIN B-12 IJ) Inject 1,000 mcg as directed every 30 days     Associated Diagnoses: B12 nutritional deficiency       !! - Potential duplicate medications found. Please discuss with provider.        Allergies   Allergies   Allergen Reactions     Blood Transfusion Related (Informational Only) Other (See Comments)     Patient has a history of a clinically significant antibody against RBC antigens.  A delay in compatible RBCs may occur.     Data   Most Recent 3 CBC's:  Recent Labs   Lab Test  03/06/17   0732  03/05/17   0710  03/04/17   0736   WBC  6.0  6.4  5.5   HGB  8.4*  9.0*  8.7*   MCV  84  83  84   PLT  151  139*  135*      Most Recent 3 BMP's:  Recent Labs   Lab Test  03/06/17   0732  03/05/17   0710  03/04/17   0736   NA  146*  145*  143   POTASSIUM  3.8  4.0  3.6   CHLORIDE  116*   116*  116*   CO2  20  19*  18*   BUN  35*  37*  35*   CR  1.54*  1.71*  1.69*   ANIONGAP  10  10  9   KENTON  8.1*  8.2*  8.1*   GLC  102*  116*  114*     Most Recent 2 LFT's:  Recent Labs   Lab Test  02/26/17   1111  07/01/16   0740   AST  30  14   ALT  27  10   ALKPHOS  151*  98   BILITOTAL  0.7  0.3     Most Recent TSH, T4 and A1c Labs:  Recent Labs   Lab Test  02/26/17   1111  04/22/16   0955   TSH  3.06   --    A1C   --   6.3*     Results for orders placed or performed during the hospital encounter of 02/25/17   Retroperitoneal US    Narrative    EXAMINATION: US RENAL COMPLETE, 2/25/2017 4:27 PM     COMPARISON: None.    HISTORY: Renal failure, concern for obstruction.    FINDINGS:    Right kidney: Measures 11.1 cm in length. Parenchyma is of normal  thickness and echogenicity. No focal mass. No hydronephrosis. 4.5 cm  cyst in the upper pole.    Left kidney: Measures 9.8 cm in length. Parenchyma is of normal  thickness and echogenicity. No focal mass. No hydronephrosis.     Bladder: Unremarkable.        Impression    IMPRESSION:  1.  Cyst in the upper pole the right kidney. Otherwise normal renal  ultrasound.    RICHY CHAVARRIA   XR Pelvis 1/2 Views    Narrative    PELVIS ONE TO TWO VIEWS February 28, 2017 6:32 PM     HISTORY: Left hip and pelvic pain on ambulation.    COMPARISON: None.       Impression    IMPRESSION: A frontal view of the pelvis demonstrates no definite  acute fracture or dislocation. Prior plate and screw fixation of the  proximal left humerus noted.    MANNIE DUMAS MD

## 2017-03-06 NOTE — PLAN OF CARE
Problem: Goal Outcome Summary  Goal: Goal Outcome Summary  Outcome: Adequate for Discharge Date Met:  03/06/17  Pt alert. Disoriented to place/time/situation. Slightly elevated BP - stable with scheduled BP meds, other VSS. Placed indwelling koehler in order to discharge back to [facility] unit. DC packet ready. Scheduled stretcher for transport @ 1530.

## 2017-03-06 NOTE — PLAN OF CARE
Problem: Goal Outcome Summary  Goal: Goal Outcome Summary  PT: Attempted PT with Pt x 1 time this morning. Pt had just received her breakfast. Unable to see Pt at this time. Attempted PT again in PM but Pt refused PT.    Pt discharging to TCU today. PT goals partially met.      Physical Therapy Discharge Summary    Reason for therapy discharge:    Discharged to transitional care facility.    Progress towards therapy goal(s). See goals on Care Plan in [facility] electronic health record for goal details.  Goals partially met.  Barriers to achieving goals:   discharge from facility.    Therapy recommendation(s):    Continued therapy is recommended.  Rationale/Recommendations:  PT at TCU to progress mobility.

## 2017-03-06 NOTE — PLAN OF CARE
Problem: Discharge Planning  Goal: Discharge Planning (Adult, OB, Behavioral, Peds)  Patient's goal is to d/c to [facility] via stretcher @ 3643 on 3/6 with orders for home RN/OT/PT services ([place])

## 2017-03-06 NOTE — PLAN OF CARE
Problem: Goal Outcome Summary  Goal: Goal Outcome Summary  Outcome: No Change  Sedated first half of the shift, hypertensive (see MD notification notes). Much more wakeful by 1800, able to tolerate a few BP meds crushed in applesauce. BP trending down. Bedrest. Retaining urine, straight cathed for 700cc at 2000. D/C plan pending.

## 2017-03-06 NOTE — PLAN OF CARE
Problem: Goal Outcome Summary  Goal: Goal Outcome Summary  Outcome: No Change  VSS. Disoriented x 3. Alert and cooperative. Up with 2 assist to bedside commode. Unable to void. Bladder scan for 370 ml and then straight cathed for 550 ml. General bruising. Still without IV access. Encouraging fluids.

## 2017-03-06 NOTE — PROGRESS NOTES
"CLINICAL NUTRITION SERVICES - REASSESSMENT NOTE          EVALUATION OF PROGRESS TOWARD GOALS   Diet:  Regular            Room Service with Assist            Ensure with meals    Visited with pt this morning - \"can you get me another glass of milk?\"  Pt had just finished breakfast - eggs, oatmeal, Ensure, milk, OJ  Got pt another milk and she drank it during visit  Flowsheets reflect pt ate 100% meals on (3/4), however, had no po intake yest 2' to increased lethargy  She is being seen daily for meal ordering assistance - ordering Ensure with every meal        NEW FINDINGS:    Working toward dc back to Memory Care    Previous Goals (2/28):   Patient to consume % of meals TID and supplements  Evaluation: Met    Previous Nutrition Diagnosis (2/28):   Inadequate oral intake related to decreased appetite as evidenced by consume on average <25% of meals for 3 days  Evaluation: Improving        CURRENT NUTRITION DIAGNOSIS  No nutrition diagnosis identified at this time     INTERVENTIONS  Recommendations / Nutrition Prescription  Regular diet  Nutrition supplements    Implementation  Continue with Ensure at meals    Goals  Pt to consume 75% meals/supplements      MONITORING AND EVALUATION:  Progress towards goals will be monitored and evaluated per protocol and Practice Guidelines        "

## 2017-03-06 NOTE — PROGRESS NOTES
RAFAELA  I: RAFAELA was updated that patient would be d/cing today. RAFAELA called [facility] to ensure they can accept patient back with a koehler and an assist of 1. Chiquita, [facility] RN, stated they can accept her back as all patient's cares are all inclusive but would prefer she d/c with home care orders for RN/OT/PT services. [facility] had asked that RAFAELA arrange services with [facility]. RAFAELA called patient's daughter to discuss this information. Patient's daughter is okay with patient d/cing today and is okay with [facility]. RAFAELA discussed transportation needs with daughter. Patient's daughter would like SW to arrange stretcher transport and is aware that patient may recieve a bill for transport. Patient does require a stretcher due to cognition as patient has advanced dementia. RAFAELA faxed PCS form. RAFAELA faxed orders to [facility]. RAFAELA will need to arrange home care with [facility].    P: RAFAELA will continue to follow and assist as needed.    ADDENDUM  I: RAFAELA spoke with [facility] to arrange home care services for patient. RAFAELA faxed orders to [phone] for home RN/PT/OT services.     Katheryn Pierce, LYLA   *[phone]

## 2017-03-06 NOTE — PLAN OF CARE
Problem: Goal Outcome Summary  Goal: Goal Outcome Summary  Outcome: Adequate for Discharge Date Met:  03/06/17  Pt transferred by [facility] transportation back to [facility] care unit. All belongings sent with patient.

## 2017-08-03 NOTE — DISCHARGE INSTRUCTIONS
Pulmonary Edema  Your healthcare provider has told you that you have pulmonary edema. Read on to learn more about pulmonary edema and how it can be treated.  What is pulmonary edema?     Pulmonary edema is fluid in the air sacs (alveoli) in the lungs.   Pulmonary edema occurs when the air sacs (alveoli) in your lungs fill with fluid. The fluid buildup makes it hard for the lungs to do their job, including getting oxygen from the air you breathe. This can make it hard to breathe. The most common cause of pulmonary edema is heart failure. When the heart doesn t work properly, it can cause pressure to rise in the veins (blood vessels) of the lungs. As pressure builds, fluid leaks out of the congested veins. It fills the alveoli. The extra fluid prevents oxygen from moving through the lungs as it should. But heart failure isn t the only cause of pulmonary edema. Damage to the lungs or kidney failure can also cause fluid to fill the lungs. And in some cases, living or exercising at high altitudes can lead to fluid buildup in the lungs.  How is pulmonary edema diagnosed?  Your healthcare provider does an exam and asks about your health history. You may also have one or more of the following:    Blood tests to take samples of blood.    Imaging tests to take detailed pictures of inside the body. These may include a chest X-ray and ultrasound.    Electrocardiography (ECG)  and echocardiogram to test how well the heart is functioning.  How is pulmonary edema treated?  Treatment usually depends on what s causing the edema. For instance, if it s because of heart failure, treating the heart condition will treat the edema. Treatment can also ease symptoms. Therapy often includes the following:    Oxygen. This may be given through a mask that goes over the nose. It may be given through a small tube that sits under the nose. Sometimes, pressurized air will be needed through a tight fitting mask, using a machine called CPAP or  BiPAP. Or it may be given through a tube placed into the windpipe (trachea). If a tube is required, a ventilator, often called a breathing machine or respirator will be used.    Medicines. These may include water pills (diuretics) to help your body get rid of extra fluid. The fluid passes out of your body as urine. You may also need medicines to treat the heart. These can help your heart work better. This helps reduce fluid buildup in the lungs.  What are the long-term concerns?  If treated right away, pulmonary edema can be improved. It may even be cured. But in some cases, ongoing treatment is needed to help control the problem. This may require having procedures or taking medicines for months or years. In some cases, you may need to use oxygen or breathing equipment for a long time. This can lead to complications such as damage to lung tissue. Your healthcare provider can tell you more if needed.  Call 911  Call 911 if any of these occur:    Chest pain    Severe trouble breathing    Coughing up blood    Skin turns blue      When to call the healthcare provider  Call your the healthcare provider right away if you have any of the following:    Unusual or irregular heartbeat    Unable to speak full sentences before running out of breath    Sweating more than usual   Date Last Reviewed: 2/1/2017 2000-2017 [employer]. [address] [place] [address], [place] [address]. All rights reserved. This information is not intended as a substitute for professional medical care. Always follow your healthcare professional's instructions.

## 2017-08-03 NOTE — ED AVS SNAPSHOT
Emergency Department    [address] [place] [id number]    Phone:  [phone]    Fax:  [phone]                                       Mallorie Erickson   MRN: [id number]    Department:   Emergency Department   Date of Visit:  8/3/2017           After Visit Summary Signature Page     I have received my discharge instructions, and my questions have been answered. I have discussed any challenges I see with this plan with the nurse or doctor.    ..........................................................................................................................................  Patient/Patient Representative Signature      ..........................................................................................................................................  Patient Representative Print Name and Relationship to Patient    ..................................................               ................................................  Date                                            Time    ..........................................................................................................................................  Reviewed by Signature/Title    ...................................................              ..............................................  Date                                                            Time

## 2017-08-03 NOTE — ED NOTES
Dr. Sarah spoke with pt's dtr Queenie who is pt's POA. Dtr states that pt was recently made a hospice pt.

## 2017-08-03 NOTE — ED PROVIDER NOTES
History     Chief Complaint:  Shortness of Breath     The history is provided by the EMS personnel. The history is limited by the condition of the patient (dementia).      Mallorie Erickson is a 89 year old female DNR/DNI with a history of chronic dementia, CAD, and CKD who presents from memory care via EMS for shortness of breath. Per EMS, nursing noticed the patient was having difficulties with breathing therefore called 911 services. Upon arrival oxygen sats were 80-85% on RA. Nebs given en route.     Allergies:  Blood Transfusion Related       Medications:    Hydralazine  Lopressor  Senokot  Seroquel  Zofran  Melatonin  Aspirin  Miralax  Voltaren  Thera-vit  Protonix  Vitamin D3  Tylenol  Mycostatin  Norvasc  Vitamin B12    Past Medical History:    Acute upper gastrointestinal hemorrhage  B12 nutritional deficiency  CKD  Colon cancer  Femur fracture  Hyperlipidemia  Hypertension  Anemia  Chronic CAD  Small bowel obstruction  Alzheimer's dementia with behavioral disturbance   Intertrochanteric fracture of left hip  Acute gastric ulcer     Past Surgical History:    Colonoscopy  Open reduction internal fixation wrist, right  Cholecystectomy, laparoscopic  Hysterectomy   Laparoscopy suspension, bladder neck  Colectomy, left partial  Esophagoscopy, gastroscopy, duodenoscopy, combined x2  Open reduction internal fixation hip nailing  Laparoscopy herniorrhaphy ventral  Laparotomy, lysis adhesions, combined       Family History:  Mother: Stomach     Social History:  The patient has never smoked.   The patient does not drink alcohol.   The patient presents with her daughter and son-in-law.  Patient lives in a memory care facility.       Review of Systems   Reason unable to perform ROS: dementia.     Physical Exam     Patient Vitals for the past 24 hrs:   BP Temp Temp src Heart Rate Resp SpO2 Weight   08/03/17 0515 188/86 97.5  F (36.4  C) Temporal 69 28 99 % 70.8 kg (156 lb)        Physical Exam  Constitutional: The patient  is oriented to self. Sometimes answers questions  HENT:   Head: Atraumatic  Right Ear: Normal  Left Ear: Normal  Nose: Nose normal.   Mouth/Throat: Oropharynx is clear and moist. No erythema or exudate.   Eyes: Conjunctivae and EOM are normal. Pupils are equal, round, and reactive to light. No discharge  Neck: Normal range of motion. Neck supple.   Cardiovascular: Normal rate, regular rhythm, no murmur gallops or rubs. Intact distal pulses.    Pulmonary/Chest: Tachypneic. Lungs markedly diminished with scattered expiratory wheezes.   Abdominal: Soft. Non tender.  No masses   Musculoskeletal: 1+ pitting edema. No bony deformity. Normal range of motion  Lymphadenopathy:     The patient has no cervical adenopathy.   Neurological: The patient is oriented to self. Sometimes answers questions  Skin: Skin is warm and dry. No rash noted. The patient is not diaphoretic.   Psychiatric: The patient has a normal mood and affect.    Emergency Department Course   Imaging:  Radiographic findings were communicated with the patient who voiced understanding of the findings.    Chest X-ray (Port):  1. Hypoinflated lungs, limiting the evaluation.  2. Increased pulmonary vascularity, suggestive of pulmonary vascular  congestion.  3. Possible small left pleural effusion.  4. Cardiomegaly.  Results per radiology.     Laboratory:  BMP:  (H),  (H),  (H), BUN 35 (H), Creat 2.17 (H), Calc 8.1 (L), ow wnl  CBC: HGB 8.4 (L), ow wnl (WBC 9.6, )    Interventions:  Duoneb 3 mL    Emergency Department Course:  Nursing notes and vitals reviewed.  I performed an exam of the patient as documented above.     Called the patient's daughter, Queenie, who is power of .    Discussed with [facility].    Called daughter and informed her of results and findings.    Discussed with Dr. Stacy [facility], will send patient back to facility.    Findings and plan explained to the family. Patient discharged back  to facility with instructions regarding supportive care and medications.    Impression & Plan    Medical Decision Making:  Mallorie Erickson is a 89 year old female brought in for difficulty breathing this morning. I suspect this is secondary to pulmonary edema. Note the patient is DNR/DNI and has a pulse form and is not supposed to be hospitalized however she was sent in by nursing home staff. She is currently enrolled with [facility] and that just started yesterday. Upon arrival here, patient was placed on BiPAP and given single nebulization. She had decreased work of breathing. Work up here does reveal worsening creatinine and a slightly lower HGB. I reviewed all of her paper work. Patient and family have strict instructions regarding care. I did speak with patient's medical power of , Queenie, and with discussion with her they do not want any aggressive measures done. I spoke with Dr. Stacy on for hospice service and with discussion with him I feel the patient can be safely returned to the nursing facility with the expectation that she may pass away from her worsening pulmonary symptoms. Family is comfortable with this plan as well. I provided prescription for ativan, morphine, and albuterol as well as PRN oxygen to make her more comfortable. Patient will be transported back to the facility via ambulance.    Diagnosis:    ICD-10-CM    1. Acute pulmonary edema (H) J81.0        Disposition:  Discharge.    Discharge Medications:  New Prescriptions    ALBUTEROL (2.5 MG/3ML) 0.083% NEB SOLUTION    Take 1 vial (2.5 mg) by nebulization every 4 hours as needed for shortness of breath / dyspnea    LORAZEPAM (ATIVAN) 0.5 MG TABLET    Take 0.5 tablets (0.25 mg) by mouth every 4 hours as needed for anxiety (shortness of breath)    MORPHINE 10 MG/5ML SOLUTION    Take 1.25 mLs (2.5 mg) by mouth every 2 hours as needed for other (shortness of breath) maximum 10 mL per day         [place] BELLO, thierry serving as a  scribe at 6:07 AM on 8/3/2017 to document services personally performed by Dr. Sarah, based on my observations and the provider's statements to me.     EMERGENCY DEPARTMENT       Jason Sarah MD  08/03/17 0639

## 2017-08-03 NOTE — ED AVS SNAPSHOT
Emergency Department    [address] [place] [place] [address]    Phone:  [phone]    Fax:  [phone]                                       Mallorie Erickson   MRN: [id number]    Department:   Emergency Department   Date of Visit:  8/3/2017           Patient Information     Date Of Birth          3/25/1923        Your diagnoses for this visit were:     Acute pulmonary edema (H)     Comfort measures only status        You were seen by Jason Sarah MD.      Follow-up Information     Follow up with Everardo Tate MD.    Specialty:  Internal Medicine    Contact information:    XXX RESIGNED XXX  [address] [place] [address]  [phone]          Discharge Instructions         Pulmonary Edema  Your healthcare provider has told you that you have pulmonary edema. Read on to learn more about pulmonary edema and how it can be treated.  What is pulmonary edema?     Pulmonary edema is fluid in the air sacs (alveoli) in the lungs.   Pulmonary edema occurs when the air sacs (alveoli) in your lungs fill with fluid. The fluid buildup makes it hard for the lungs to do their job, including getting oxygen from the air you breathe. This can make it hard to breathe. The most common cause of pulmonary edema is heart failure. When the heart doesn t work properly, it can cause pressure to rise in the veins (blood vessels) of the lungs. As pressure builds, fluid leaks out of the congested veins. It fills the alveoli. The extra fluid prevents oxygen from moving through the lungs as it should. But heart failure isn t the only cause of pulmonary edema. Damage to the lungs or kidney failure can also cause fluid to fill the lungs. And in some cases, living or exercising at high altitudes can lead to fluid buildup in the lungs.  How is pulmonary edema diagnosed?  Your healthcare provider does an exam and asks about your health history. You may also have one or more of the following:    Blood tests to take  samples of blood.    Imaging tests to take detailed pictures of inside the body. These may include a chest X-ray and ultrasound.    Electrocardiography (ECG)  and echocardiogram to test how well the heart is functioning.  How is pulmonary edema treated?  Treatment usually depends on what s causing the edema. For instance, if it s because of heart failure, treating the heart condition will treat the edema. Treatment can also ease symptoms. Therapy often includes the following:    Oxygen. This may be given through a mask that goes over the nose. It may be given through a small tube that sits under the nose. Sometimes, pressurized air will be needed through a tight fitting mask, using a machine called CPAP or BiPAP. Or it may be given through a tube placed into the windpipe (trachea). If a tube is required, a ventilator, often called a breathing machine or respirator will be used.    Medicines. These may include water pills (diuretics) to help your body get rid of extra fluid. The fluid passes out of your body as urine. You may also need medicines to treat the heart. These can help your heart work better. This helps reduce fluid buildup in the lungs.  What are the long-term concerns?  If treated right away, pulmonary edema can be improved. It may even be cured. But in some cases, ongoing treatment is needed to help control the problem. This may require having procedures or taking medicines for months or years. In some cases, you may need to use oxygen or breathing equipment for a long time. This can lead to complications such as damage to lung tissue. Your healthcare provider can tell you more if needed.  Call 911  Call 911 if any of these occur:    Chest pain    Severe trouble breathing    Coughing up blood    Skin turns blue      When to call the healthcare provider  Call your the healthcare provider right away if you have any of the following:    Unusual or irregular heartbeat    Unable to speak full sentences  before running out of breath    Sweating more than usual   Date Last Reviewed: 2/1/2017 2000-2017 [employer]. [address] [place] [address], [place] [address]. All rights reserved. This information is not intended as a substitute for professional medical care. Always follow your healthcare professional's instructions.          24 Hour Appointment Hotline       To make an appointment at any [facility], call [phone] ([phone]). If you don't have a family doctor or clinic, we will help you find one. [facility] are conveniently located to serve the needs of you and your family.          ED Discharge Orders     Oxygen                    Review of your medicines      START taking        Dose / Directions Last dose taken    albuterol (2.5 MG/3ML) 0.083% neb solution   Dose:  1 vial   Quantity:  1 Box        Take 1 vial (2.5 mg) by nebulization every 4 hours as needed for shortness of breath / dyspnea   Refills:  0        LORazepam 0.5 MG tablet   Commonly known as:  ATIVAN   Dose:  0.25 mg   Quantity:  20 tablet        Take 0.5 tablets (0.25 mg) by mouth every 4 hours as needed for anxiety (shortness of breath)   Refills:  0        morphine 10 MG/5ML solution   Dose:  2.5 mg   Quantity:  45 mL        Take 1.25 mLs (2.5 mg) by mouth every 2 hours as needed for other (shortness of breath) maximum 10 mL per day   Refills:  0          Our records show that you are taking the medicines listed below. If these are incorrect, please call your family doctor or clinic.        Dose / Directions Last dose taken    ACETAMINOPHEN PO   Dose:  650 mg        Take 650 mg by mouth every 6 hours as needed for pain   Refills:  0        amLODIPine 10 MG tablet   Commonly known as:  NORVASC   Dose:  10 mg   Quantity:  30 tablet        Take 1 tablet (10 mg) by mouth daily   Refills:  0        diclofenac 1 % Gel topical gel   Commonly known as:  VOLTAREN   Dose:  2 g        Place 2 g onto the skin 2 times daily  To elbow   Refills:  0        hydrALAZINE 25 MG tablet   Commonly known as:  APRESOLINE   Dose:  25 mg   Quantity:  60 tablet        Take 1 tablet (25 mg) by mouth every 8 hours   Refills:  0        MELATONIN PO   Dose:  3 mg        Take 3 mg by mouth At Bedtime   Refills:  0        metoprolol 50 MG tablet   Commonly known as:  LOPRESSOR   Dose:  75 mg   Quantity:  60 tablet        Take 1.5 tablets (75 mg) by mouth 2 times daily   Refills:  0        nystatin [id number] UNIT/GM Powd   Commonly known as:  MYCOSTATIN   Quantity:  1 Bottle        Apply bid to groin and under pannus   Refills:  12        polyethylene glycol powder   Commonly known as:  MIRALAX/GLYCOLAX   Dose:  17 g        Take 17 g by mouth daily   Refills:  0        PROTONIX PO   Dose:  40 mg        Take 40 mg by mouth every morning (before breakfast)   Refills:  0        * senna-docusate 8.6-50 MG per tablet   Commonly known as:  SENOKOT-S;PERICOLACE   Dose:  1 tablet        Take 1 tablet by mouth daily as needed for constipation   Refills:  0        * senna-docusate 8.6-50 MG per tablet   Commonly known as:  SENOKOT-S;PERICOLACE   Dose:  1 tablet        Take 1 tablet by mouth daily Hold if loose stools   Refills:  0        * SEROQUEL PO   Dose:  25 mg        Take 25 mg by mouth daily At 4PM   Refills:  0        * SEROQUEL PO   Dose:  12.5 mg   Indication:  aggitation        Take 12.5 mg by mouth every 4 hours as needed   Refills:  0        * SEROQUEL PO   Dose:  12.5 mg        Take 12.5 mg by mouth every morning   Refills:  0        VITAMIN B-12 IJ   Dose:  1000 mcg        Inject 1,000 mcg as directed every 30 days   Refills:  0        VITAMIN D3 PO   Dose:  2000 Units        Take 2,000 Units by mouth daily   Refills:  0        * Notice:  This list has 5 medication(s) that are the same as other medications prescribed for you. Read the directions carefully, and ask your doctor or other care provider to review them with you.            Prescriptions were  sent or printed at these locations (3 Prescriptions)                   Other Prescriptions                Printed at Department/Unit printer (3 of 3)         albuterol (2.5 MG/3ML) 0.083% neb solution               morphine 10 MG/5ML solution               LORazepam (ATIVAN) 0.5 MG tablet                Procedures and tests performed during your visit     Basic metabolic panel    CBC with platelets + differential    XR Chest Port 1 View      Orders Needing Specimen Collection     None      Pending Results     No orders found from 8/1/2017 to 8/4/2017.            Pending Culture Results     No orders found from 8/1/2017 to 8/4/2017.            Pending Results Instructions     If you had any lab results that were not finalized at the time of your Discharge, you can call the ED Lab Result RN at [phone]. You will be contacted by this team for any positive Lab results or changes in treatment. The nurses are available 7 days a week from 10A to 6:30P.  You can leave a message 24 hours per day and they will return your call.        Test Results From Your Hospital Stay        8/3/2017  5:47 AM      Component Results     Component Value Ref Range & Units Status    Sodium 147 (H) 133 - 144 mmol/L Final    Potassium 3.5 3.4 - 5.3 mmol/L Final    Chloride 116 (H) 94 - 109 mmol/L Final    Carbon Dioxide 21 20 - 32 mmol/L Final    Anion Gap 10 3 - 14 mmol/L Final    Glucose 132 (H) 70 - 99 mg/dL Final    Urea Nitrogen 35 (H) 7 - 30 mg/dL Final    Creatinine 2.17 (H) 0.52 - 1.04 mg/dL Final    GFR Estimate 21 (L) >60 mL/min/1.7m2 Final    Non  GFR Calc    GFR Estimate If Black 26 (L) >60 mL/min/1.7m2 Final    African American GFR Calc    Calcium 8.1 (L) 8.5 - 10.1 mg/dL Final         8/3/2017  5:52 AM      Narrative     CHEST SINGLE VIEW PORTABLE   8/3/2017 5:30 AM     HISTORY: Shortness of breath.    COMPARISON: 4/21/2016.    FINDINGS: Hypoinflated lungs. Increased pulmonary vascularity in both  lungs.  Possible small left pleural effusion. Cardiomegaly.  Atherosclerotic calcification in the thoracic aorta. Diffuse  osteopenia.        Impression     IMPRESSION:   1. Hypoinflated lungs, limiting the evaluation.  2. Increased pulmonary vascularity, suggestive of pulmonary vascular  congestion.  3. Possible small left pleural effusion.  4. Cardiomegaly.    JERSON LEGGETT MD         8/3/2017  5:31 AM      Component Results     Component Value Ref Range & Units Status    WBC 9.6 4.0 - 11.0 10e9/L Final    RBC Count 3.12 (L) 3.8 - 5.2 10e12/L Final    Hemoglobin 8.4 (L) 11.7 - 15.7 g/dL Final    Hematocrit 26.4 (L) 35.0 - 47.0 % Final    MCV 85 78 - 100 fl Final    MCH 26.9 26.5 - 33.0 pg Final    MCHC 31.8 31.5 - 36.5 g/dL Final    RDW 15.9 (H) 10.0 - 15.0 % Final    Platelet Count 167 150 - 450 10e9/L Final    Diff Method Automated Method  Final    % Neutrophils 76.0 % Final    % Lymphocytes 16.3 % Final    % Monocytes 4.9 % Final    % Eosinophils 2.3 % Final    % Basophils 0.2 % Final    % Immature Granulocytes 0.3 % Final    Nucleated RBCs 0 0 /100 Final    Absolute Neutrophil 7.3 1.6 - 8.3 10e9/L Final    Absolute Lymphocytes 1.6 0.8 - 5.3 10e9/L Final    Absolute Monocytes 0.5 0.0 - 1.3 10e9/L Final    Absolute Eosinophils 0.2 0.0 - 0.7 10e9/L Final    Absolute Basophils 0.0 0.0 - 0.2 10e9/L Final    Abs Immature Granulocytes 0.0 0 - 0.4 10e9/L Final    Absolute Nucleated RBC 0.0  Final                Clinical Quality Measure: Blood Pressure Screening     Your blood pressure was checked while you were in the emergency department today. The last reading we obtained was  BP: 188/86 . Please read the guidelines below about what these numbers mean and what you should do about them.  If your systolic blood pressure (the top number) is less than 120 and your diastolic blood pressure (the bottom number) is less than 80, then your blood pressure is normal. There is nothing more that you need to do about it.  If your  "systolic blood pressure (the top number) is 120-139 or your diastolic blood pressure (the bottom number) is 80-89, your blood pressure may be higher than it should be. You should have your blood pressure rechecked within a year by a primary care provider.  If your systolic blood pressure (the top number) is 140 or greater or your diastolic blood pressure (the bottom number) is 90 or greater, you may have high blood pressure. High blood pressure is treatable, but if left untreated over time it can put you at risk for heart attack, stroke, or kidney failure. You should have your blood pressure rechecked by a primary care provider within the next 4 weeks.  If your provider in the emergency department today gave you specific instructions to follow-up with your doctor or provider even sooner than that, you should follow that instruction and not wait for up to 4 weeks for your follow-up visit.        Thank you for choosing [facility]       Thank you for choosing [facility] for your care. Our goal is always to provide you with excellent care. Hearing back from our patients is one way we can continue to improve our services. Please take a few minutes to complete the written survey that you may receive in the mail after you visit with us. Thank you!        [employer] Information     [employer] lets you send messages to your doctor, view your test results, renew your prescriptions, schedule appointments and more. To sign up, go to [email or web address] . Click on \"Log in\" on the left side of the screen, which will take you to the Welcome page. Then click on \"Sign up Now\" on the right side of the page.     You will be asked to enter the access code listed below, as well as some personal information. Please follow the directions to create your username and password.     Your access code is: [email or web address]  Expires: 2017  6:34 AM     Your access code will  in 90 days. If you need help or a new code, please call your [facility] " [facility] or [phone].        Care EveryWhere ID     This is your Care EveryWhere ID. This could be used by other organizations to access your [facility] medical records  [id number]        Equal Access to Services     LILLIAN ARAGON : Pretty Thompson, walenoda luleahadaha, qaybta kaalmada mctwilarenea, ritehs whyte. So [facility] [phone].    ATENCIÓN: Si habla español, tiene a coats disposición servicios gratuitos de asistencia lingüística. Llame al [phone].    We comply with applicable federal civil rights laws and [place] laws. We do not discriminate on the basis of race, color, national origin, age, disability sex, sexual orientation or gender identity.            After Visit Summary       This is your record. Keep this with you and show to your community pharmacist(s) and doctor(s) at your next visit.